# Patient Record
Sex: FEMALE | Race: WHITE | Employment: FULL TIME | URBAN - METROPOLITAN AREA
[De-identification: names, ages, dates, MRNs, and addresses within clinical notes are randomized per-mention and may not be internally consistent; named-entity substitution may affect disease eponyms.]

---

## 2017-01-06 ENCOUNTER — OFFICE VISIT (OUTPATIENT)
Dept: CARDIOLOGY CLINIC | Age: 52
End: 2017-01-06

## 2017-01-06 VITALS
WEIGHT: 270 LBS | SYSTOLIC BLOOD PRESSURE: 130 MMHG | HEIGHT: 66 IN | DIASTOLIC BLOOD PRESSURE: 80 MMHG | OXYGEN SATURATION: 98 % | RESPIRATION RATE: 16 BRPM | BODY MASS INDEX: 43.39 KG/M2

## 2017-01-06 DIAGNOSIS — I44.2 COMPLETE HEART BLOCK (HCC): ICD-10-CM

## 2017-01-06 DIAGNOSIS — I42.9 CARDIOMYOPATHY (HCC): Primary | ICD-10-CM

## 2017-01-06 NOTE — MR AVS SNAPSHOT
Visit Information Date & Time Provider Department Dept. Phone Encounter #  
 1/6/2017  8:20 AM Celine Hsieh MD [facility] [place] [facility] [phone] [id number] Upcoming Health Maintenance Date Due Hepatitis C Screening 1965 FOBT Q 1 YEAR AGE 50-75 7/3/2014 INFLUENZA AGE 9 TO ADULT 8/1/2016 BREAST CANCER SCRN MAMMOGRAM 9/13/2018 PAP AKA CERVICAL CYTOLOGY 9/13/2019 DTaP/Tdap/Td series (2 - Td) 9/13/2026 Allergies as of 1/6/2017  Review Complete On: 1/6/2017 By: Marilu Landry No Known Allergies Current Immunizations  Reviewed on 9/13/2016 Name Date Influenza Vaccine 12/1/2013 Pneumococcal Polysaccharide (PPSV-23) 2/12/2014  3:25 PM  
 Tdap 9/13/2016 Not reviewed this visit You Were Diagnosed With   
  
 Codes Comments Complete heart block (HCC)    -  Primary ICD-10-CM: G62.0 ICD-9-CM: 426.0 Vitals BP Resp Height(growth percentile) Weight(growth percentile) SpO2 BMI  
 130/80 (BP 1 Location: Left arm, BP Patient Position: Sitting) 16 5' 6\" (1.676 m) 270 lb (122.5 kg) 98% 43.58 kg/m2 OB Status Smoking Status Postmenopausal Never Smoker BMI and BSA Data Body Mass Index Body Surface Area 43.58 kg/m 2 2.39 m 2 Preferred Pharmacy Pharmacy Name Phone [id number] [facility] [phone] Your Updated Medication List  
  
   
This list is accurate as of: 1/6/17  9:03 AM.  Always use your most recent med list.  
  
  
  
  
 aspirin 81 mg chewable tablet Take 81 mg by mouth daily. carvedilol 6.25 mg tablet Commonly known as:  COREG  
TAKE 1 TABLET BY MOUTH TWICE A DAY  
  
 ergocalciferol 50,000 unit capsule Commonly known as:  ERGOCALCIFEROL Take 1 Cap by mouth every seven (7) days. furosemide 20 mg tablet Commonly known as:  LASIX Take 1 Tab by mouth daily. lisinopril 2.5 mg tablet Commonly known as:  Thersia Kubas Take 1 Tab by mouth daily. potassium chloride SA 10 mEq capsule Commonly known as:  MICRO-K  
TAKE ONE CAPSULE BY MOUTH EVERY DAY We Performed the Following AMB POC EKG ROUTINE W/ 12 LEADS, INTER & REP [61343 CPT(R)] Patient Instructions Echo in 6 months and see Markell Story a week after Introducing [facility] & HEALTH SERVICES! Dear Jarvis Benson: Thank you for requesting a [employer] account. Our records indicate that you already have an active [employer] Systems account. You can access your account anytime at [email or web address] Fly me to the [email or web address]! Did you know that you can access your hospital and ER discharge instructions at any time in [employer] Systems? You can also review all of your test results from your hospital stay or ER visit. Additional Information If you have questions, please visit the Frequently Asked Questions section of the [employer] Systems website at [email or web address] Fly me to the [email or web address] Remember, [employer] Systems is NOT to be used for urgent needs. For medical emergencies, dial 911. Now available from your iPhone and Android! Please provide this summary of care documentation to your next provider. Your primary care clinician is listed as Sid Cuenca. If you have any questions after today's visit, please call [phone].

## 2017-01-06 NOTE — PROGRESS NOTES
HISTORY OF PRESENT ILLNESS  Elvis Perez is a 45 y.o. female. previously followed by Dr. Mike Crabtree. She has a history of hypertension and previous moderate left ventricular systolic dysfunction by echocardiography. She underwent a left heart cardiac catheterization following an abnormal stress test. This apparently occurred at [facility] in 2006, and revealed no evidence of significant coronary obstructive disease. Her ejection fraction eventually improved. Date of previous stress test was 4/2007. This was a Persantine Myoview stress test. No chest pain was reported. Indeterminate electrocardiographic changes. There were no evidences for gross ischemia. There was fixed defect at the level of the anterior wall for which tissue attenuation could be responsible. Ejection fraction was estimated at 52%. echocardiogram on 3/2007. Normal left ventricular systolic function with an ejection fraction approximately of 50%, mild left ventricular hypertrophy, trivial aortic insufficiency and mild mitral regurgitation. Stress echo on 2010 no ischemia or MI and EF at rest 50%  Echo on 2/14:Systolic function was at the lower limits of normal.  Ejection fraction was estimated to be 45 % in the range of 40 % to 50 %. There was mild diffuse hypokinesis. Left atrium: The atrium was mildly dilated. Tricuspid valve: There was mild regurgitation. PPM for CHB on 2/14 with Dr. Geri Mcpherson, unfortunately leads dislodge next day and patient was taken to [facility] after syncopal episode and second PPM placed  Echo on 11/15:Systolic function was mildly reduced by EF (biplane method  of disks). Ejection fraction was estimated to be 44 %. There was mild  diffuse hypokinesis. There was moderate hypokinesis of the mid-apical  anterior, mid-apical inferior, and apical lateral wall(s). Doppler  parameters were consistent with abnormal left ventricular relaxation  (grade 1 diastolic dysfunction). Left atrium:  The atrium was mildly dilated. ECHO on 6/16:Left ventricle: Size was normal. Systolic function was by EF (biplane  method of disks). Ejection fraction was estimated to be 44 %. There was  moderate hypokinesis of the mid-apical anterior wall(s). Wall thickness  was normal.  COMPARISONS:  There has been no significant change. Comparison was made with the  previous study of 06-Nov-2015. Past Medical History   Diagnosis Date    Essential hypertension     Left ventricular dysfunction      Past Surgical History   Procedure Laterality Date    Pr cardiac surg procedure unlist       cath 2005    Ins ppm/icd led dual only  2/12/2014          Hx orthopaedic  1990     arthroscopic surgery right knee     Social History     Social History    Marital status:      Spouse name: N/A    Number of children: N/A    Years of education: N/A     Occupational History    Not on file. Social History Main Topics    Smoking status: Never Smoker    Smokeless tobacco: Never Used    Alcohol use No    Drug use: Not on file    Sexual activity: Not on file     Other Topics Concern    Not on file     Social History Narrative       HPI  Doing well no complaints at all  Review of Systems   Respiratory: Negative. Cardiovascular: Negative. Physical Exam  Physical Exam   Blood pressure 130/80, resp. rate 16, height 5' 6\" (1.676 m), weight 122.5 kg (270 lb), SpO2 98 %. Constitutional: She is oriented to person, place, and time. She appears well-developed and well-nourished. No distress. HENT: Head: Normocephalic. Eyes: No scleral icterus. Neck: Normal range of motion. Neck supple. No JVD present. No tracheal deviation present. Cardiovascular: Normal rate, regular rhythm, normal heart sounds and intact distal pulses. Exam reveals no gallop and no friction rub. No murmur heard. Pulmonary/Chest: Effort normal and breath sounds normal. No stridor. No respiratory distress, wheezes or  rales.    Abdominal: She exhibits no distension. Musculoskeletal: She exhibits no edema. Neurological: She is alert and oriented to person, place, and time. Coordination normal.   Skin: Skin is warm. No rash noted. Not diaphoretic. No erythema. Psychiatric:  Normal mood and affect. Behavior is normal.   Current Outpatient Prescriptions on File Prior to Visit   Medication Sig Dispense Refill    potassium chloride SA (MICRO-K) 10 mEq capsule TAKE ONE CAPSULE BY MOUTH EVERY DAY 90 Cap 3    carvedilol (COREG) 6.25 mg tablet TAKE 1 TABLET BY MOUTH TWICE A  Tab 3    ergocalciferol (ERGOCALCIFEROL) 50,000 unit capsule Take 1 Cap by mouth every seven (7) days. 12 Cap 1    aspirin 81 mg chewable tablet Take 81 mg by mouth daily.  furosemide (LASIX) 20 mg tablet Take 1 Tab by mouth daily. 90 Tab 3    lisinopril (PRINIVIL, ZESTRIL) 2.5 mg tablet Take 1 Tab by mouth daily. 90 Tab 3     No current facility-administered medications on file prior to visit. ASSESSMENT and PLAN  CMP: mild non ischemic, lv dysfunction , possibly multifactorial. clinically it seems compensated,  NYHA class1  Her ECG showed NSR and V paced. Continue coreg, and lisinopril 2.5 mg daily. no untoward effects thus far and have discussed potential side effects of medications  Continue  lasix 20 mg and potassium 10 meq. Obesity:She has lost 4 lbs!   We have discussed at length, need for exercise and loose weight and different ways to do it  PPM: continue routine surveillance  HTN:controlled  HLD: borderline ldl , for now weight loss and exercise and hold off pharmacological rx  Hba1c: elevated long conversation about pre diabetes and need for carbs reduction and again weight loss and exercise, she will follow with her pcp  See her back in 6 months

## 2017-01-12 DIAGNOSIS — I42.9 CARDIOMYOPATHY (HCC): ICD-10-CM

## 2017-01-12 DIAGNOSIS — I10 ESSENTIAL HYPERTENSION, BENIGN: ICD-10-CM

## 2017-01-12 RX ORDER — FUROSEMIDE 20 MG/1
20 TABLET ORAL DAILY
Qty: 90 TAB | Refills: 3 | Status: SHIPPED | OUTPATIENT
Start: 2017-01-12 | End: 2018-01-16 | Stop reason: SDUPTHER

## 2017-01-12 NOTE — TELEPHONE ENCOUNTER
Patient is out of this medication    Pharmacy verified     90 day supply with refills     Thank you, Leonela Garcia

## 2017-01-12 NOTE — TELEPHONE ENCOUNTER
Patient is out of this medication    Pharmacy verified     90 day supply with refills     Thank you, Sena

## 2017-04-11 DIAGNOSIS — E56.9 VITAMIN DEFICIENCY: ICD-10-CM

## 2017-04-11 RX ORDER — ERGOCALCIFEROL 1.25 MG/1
CAPSULE ORAL
Qty: 12 CAP | Refills: 1 | Status: SHIPPED | OUTPATIENT
Start: 2017-04-11 | End: 2018-02-02

## 2017-07-11 ENCOUNTER — CLINICAL SUPPORT (OUTPATIENT)
Dept: CARDIOLOGY CLINIC | Age: 52
End: 2017-07-11

## 2017-07-11 DIAGNOSIS — R07.9 CHEST PAIN, UNSPECIFIED: ICD-10-CM

## 2017-07-11 DIAGNOSIS — I44.2 COMPLETE HEART BLOCK (HCC): ICD-10-CM

## 2017-07-11 DIAGNOSIS — I10 ESSENTIAL HYPERTENSION, BENIGN: ICD-10-CM

## 2017-07-11 DIAGNOSIS — R42 DIZZINESS: ICD-10-CM

## 2017-07-11 DIAGNOSIS — Z95.0 S/P PLACEMENT OF CARDIAC PACEMAKER: ICD-10-CM

## 2017-07-11 DIAGNOSIS — I42.9 CARDIOMYOPATHY, UNSPECIFIED TYPE (HCC): ICD-10-CM

## 2017-07-11 DIAGNOSIS — R94.31 NONSPECIFIC ABNORMAL ELECTROCARDIOGRAM (ECG) (EKG): Primary | ICD-10-CM

## 2018-02-02 ENCOUNTER — OFFICE VISIT (OUTPATIENT)
Dept: CARDIOLOGY CLINIC | Age: 53
End: 2018-02-02

## 2018-02-02 VITALS
WEIGHT: 264 LBS | HEIGHT: 66 IN | BODY MASS INDEX: 42.43 KG/M2 | DIASTOLIC BLOOD PRESSURE: 88 MMHG | SYSTOLIC BLOOD PRESSURE: 138 MMHG | OXYGEN SATURATION: 96 % | HEART RATE: 71 BPM | RESPIRATION RATE: 12 BRPM

## 2018-02-02 DIAGNOSIS — I10 ESSENTIAL HYPERTENSION, BENIGN: ICD-10-CM

## 2018-02-02 DIAGNOSIS — I42.9 CARDIOMYOPATHY, UNSPECIFIED TYPE (HCC): Primary | ICD-10-CM

## 2018-02-02 PROBLEM — E66.01 OBESITY, MORBID (HCC): Status: ACTIVE | Noted: 2018-02-02

## 2018-02-02 NOTE — MR AVS SNAPSHOT
[address] [facility] [address] [facility] 
[phone] Patient: Israel Lawler MRN: [id number] MCZ:[id number] Visit Information Date & Time Provider Department Dept. Phone Encounter #  
 2/2/2018  9:20 AM Mauro Tunrer MD [facility] [phone] [id number] Follow-up Instructions Return in about 6 months (around 8/2/2018). Follow-up and Disposition History Upcoming Health Maintenance Date Due Hepatitis C Screening 1965 FOBT Q 1 YEAR AGE 50-75 7/3/2014 Influenza Age 5 to Adult 8/1/2017 BREAST CANCER SCRN MAMMOGRAM 9/13/2018 PAP AKA CERVICAL CYTOLOGY 9/13/2019 DTaP/Tdap/Td series (2 - Td) 9/13/2026 Allergies as of 2/2/2018  Review Complete On: 2/2/2018 By: Mauro Turner MD  
 No Known Allergies Current Immunizations  Reviewed on 9/13/2016 Name Date Influenza Vaccine 12/1/2013 Pneumococcal Polysaccharide (PPSV-23) 2/12/2014  3:25 PM  
 Tdap 9/13/2016 Not reviewed this visit You Were Diagnosed With   
  
 Codes Comments [facility]    -  Primary ICD-10-CM: I42.9 ICD-9-CM: 425.4 Essential hypertension, benign     ICD-10-CM: I10 
ICD-9-CM: 401.1 Vitals BP Pulse Resp Height(growth percentile) Weight(growth percentile) SpO2  
 138/88 (BP 1 Location: Right arm, BP Patient Position: Sitting) 71 12 5' 6\" (1.676 m) 264 lb (119.7 kg) 96% BMI OB Status Smoking Status 42.61 kg/m2 Postmenopausal Never Smoker BMI and BSA Data Body Mass Index Body Surface Area  
 42.61 kg/m 2 2.36 m 2 Preferred Pharmacy Pharmacy Name Phone [id number] [facility] [phone] Your Updated Medication List  
  
   
This list is accurate as of: 2/2/18 10:41 AM.  Always use your most recent med list.  
  
  
  
  
 aspirin 81 mg chewable tablet Take 81 mg by mouth daily. carvedilol 6.25 mg tablet Commonly known as:  COREG  
TAKE 1 TABLET BY MOUTH TWICE A DAY  
  
 furosemide 20 mg tablet Commonly known as:  LASIX TAKE 1 TAB BY MOUTH DAILY. lisinopril 2.5 mg tablet Commonly known as:  Alcario Deepak Take 1 Tab by mouth daily. potassium chloride SA 10 mEq capsule Commonly known as:  MICRO-K  
TAKE ONE CAPSULE BY MOUTH EVERY DAY  
  
 VITAMIN B-6 PO Take  by mouth daily. We Performed the Following AMB POC EKG ROUTINE W/ 12 LEADS, INTER & REP [92861 CPT(R)] Follow-up Instructions Return in about 6 months (around 8/2/2018). Patient Instructions Echo in 6 months and see Gómez Benoit a week after Introducing [facility] & HEALTH SERVICES! Dear Anastasiya Reynolds: Thank you for requesting a [employer] account. Our records indicate that you already have an active [employer] account. You can access your account anytime at [email or web address]. [email or web address] Did you know that you can access your hospital and ER discharge instructions at any time in [employer]? You can also review all of your test results from your hospital stay or ER visit. Additional Information If you have questions, please visit the Frequently Asked Questions section of the [employer] website at [email or web address] Remember, [employer] is NOT to be used for urgent needs. For medical emergencies, dial 911. Now available from your iPhone and Android! Please provide this summary of care documentation to your next provider. Your primary care clinician is listed as Shona Strickland. If you have any questions after today's visit, please call [phone].

## 2018-02-02 NOTE — PROGRESS NOTES
HISTORY OF PRESENT ILLNESS  Sudarshan Oconnor is a 47 y.o. female. previously followed by Dr. Columba De La Cruz. She has a history of hypertension and previous moderate left ventricular systolic dysfunction by echocardiography. She underwent a left heart cardiac catheterization following an abnormal stress test. This apparently occurred at [facility] in 2006, and revealed no evidence of significant coronary obstructive disease. Her ejection fraction eventually improved. Date of previous stress test was 4/2007. This was a Persantine Myoview stress test. No chest pain was reported. Indeterminate electrocardiographic changes. There were no evidences for gross ischemia. There was fixed defect at the level of the anterior wall for which tissue attenuation could be responsible. Ejection fraction was estimated at 52%. echocardiogram on 3/2007. Normal left ventricular systolic function with an ejection fraction approximately of 50%, mild left ventricular hypertrophy, trivial aortic insufficiency and mild mitral regurgitation. Stress echo on 2010 no ischemia or MI and EF at rest 50%  Echo on 2/14:Systolic function was at the lower limits of normal.  Ejection fraction was estimated to be 45 % in the range of 40 % to 50 %. There was mild diffuse hypokinesis. Left atrium: The atrium was mildly dilated. Tricuspid valve: There was mild regurgitation. PPM for CHB on 2/14 with Dr. Maryam Liu, unfortunately leads dislodge next day and patient was taken to [facility] after syncopal episode and second PPM placed  Echo on 11/15:Systolic function was mildly reduced by EF (biplane method  of disks). Ejection fraction was estimated to be 44 %. There was mild  diffuse hypokinesis. There was moderate hypokinesis of the mid-apical  anterior, mid-apical inferior, and apical lateral wall(s). Doppler  parameters were consistent with abnormal left ventricular relaxation  (grade 1 diastolic dysfunction). Left atrium:  The atrium was mildly dilated. ECHO on 6/16:Left ventricle: Size was normal. Systolic function was by EF (biplane  method of disks). Ejection fraction was estimated to be 44 %. There was  moderate hypokinesis of the mid-apical anterior wall(s). Wall thickness  was normal.  COMPARISONS:  There has been no significant change. Comparison was made with the  previous study of 06-Nov-2015. ECHO on 7/17:Systolic function was at the lower limits of normal.  Ejection fraction was estimated in the range of 50 % to 55 %. There were  no regional wall motion abnormalities. COMPARISONS:  Comparison was made with the previous study of 07-Jun-2016. LV overall  function has increased from 44 % to 53 %. Past Medical History:   Diagnosis Date    Essential hypertension     Left ventricular dysfunction      Past Surgical History:   Procedure Laterality Date    CARDIAC SURG PROCEDURE UNLIST      cath 2005     ORTHOPAEDIC  1990    arthroscopic surgery right knee    INS PPM/ICD LED DUAL ONLY  2/12/2014            HPI  Doing well no cp or palpitations or syncope no worsening sob mostly stressed  Some snoring reported  Review of Systems   Respiratory: Negative. Cardiovascular: Negative. Physical Exam  Physical Exam   Blood pressure 138/88, pulse 71, resp. rate 12, height 5' 6\" (1.676 m), weight 119.7 kg (264 lb), SpO2 96 %. Constitutional: She is oriented to person, place, and time. She appears well-developed and well-nourished. No distress. HENT: Head: Normocephalic. Eyes: No scleral icterus. Neck: Normal range of motion. Neck supple. No JVD present. No tracheal deviation present. Cardiovascular: Normal rate, regular rhythm, normal heart sounds and intact distal pulses. Exam reveals no gallop and no friction rub. No murmur heard. Pulmonary/Chest: Effort normal and breath sounds normal. No stridor. No respiratory distress, wheezes or  rales. Abdominal: She exhibits no distension.    Musculoskeletal: She exhibits no edema. Neurological: She is alert and oriented to person, place, and time. Coordination normal.   Skin: Skin is warm. No rash noted. Not diaphoretic. No erythema. Psychiatric:  Normal mood and affect. Behavior is normal.   Current Outpatient Prescriptions on File Prior to Visit   Medication Sig Dispense Refill    carvedilol (COREG) 6.25 mg tablet TAKE 1 TABLET BY MOUTH TWICE A DAY 60 Tab 3    potassium chloride SA (MICRO-K) 10 mEq capsule TAKE ONE CAPSULE BY MOUTH EVERY DAY 30 Cap 0    furosemide (LASIX) 20 mg tablet TAKE 1 TAB BY MOUTH DAILY. 30 Tab 3    aspirin 81 mg chewable tablet Take 81 mg by mouth daily.  lisinopril (PRINIVIL, ZESTRIL) 2.5 mg tablet Take 1 Tab by mouth daily. 90 Tab 3     No current facility-administered medications on file prior to visit. ASSESSMENT and PLAN  CMP: mild , non ischemic, lv dysfunction , possibly multifactorial. clinically it seems compensated,  NYHA class 1  Her ECG showed NSR and V paced. Continue coreg, and lisinopril 2.5 mg daily. no untoward effects thus far and have discussed potential side effects of medications  Continue  lasix 20 mg and potassium 10 meq. We have discussed entresto but this is on the back burner for now  Obesity:She has lost 6 more lbs!   We have discussed at length, need for exercise and loose weight and different ways to do it  PPM: continue routine surveillance with Dr. Laura Fowler  HTN: relatively controlled  Low salt diet  HLD: closely followed by her pcp  Fatigue: likely multifactorial, coreg playing a role discussed potential for toprol qhs but she wants to wait also consder sleep study for kirsten , she will discuss with her pcp    See her back in 6 months with echo

## 2018-02-02 NOTE — PROGRESS NOTES
Chief Complaint   Patient presents with    Hypertension     yearly follow up. Denies chest pain. Occasional swelling in left leg.  Cardiomyopathy    Shortness of Breath     continues    Dizziness     states evaluated with inner ear problems. Medication profile updated. Verbal order per Dr. Rikki Treadwell.

## 2018-08-02 ENCOUNTER — CLINICAL SUPPORT (OUTPATIENT)
Dept: CARDIOLOGY CLINIC | Age: 53
End: 2018-08-02

## 2018-08-02 DIAGNOSIS — I44.2 COMPLETE HEART BLOCK (HCC): ICD-10-CM

## 2018-08-02 DIAGNOSIS — R94.31 NONSPECIFIC ABNORMAL ELECTROCARDIOGRAM (ECG) (EKG): ICD-10-CM

## 2018-08-02 DIAGNOSIS — I10 ESSENTIAL HYPERTENSION, BENIGN: ICD-10-CM

## 2018-08-02 DIAGNOSIS — E66.01 OBESITY, MORBID (HCC): ICD-10-CM

## 2018-08-02 DIAGNOSIS — I42.9 CARDIOMYOPATHY, UNSPECIFIED TYPE (HCC): ICD-10-CM

## 2018-08-02 DIAGNOSIS — R42 DIZZINESS: ICD-10-CM

## 2018-08-02 DIAGNOSIS — Z95.0 S/P PLACEMENT OF CARDIAC PACEMAKER: ICD-10-CM

## 2018-09-21 ENCOUNTER — OFFICE VISIT (OUTPATIENT)
Dept: CARDIOLOGY CLINIC | Age: 53
End: 2018-09-21

## 2018-09-21 VITALS
WEIGHT: 281 LBS | RESPIRATION RATE: 16 BRPM | DIASTOLIC BLOOD PRESSURE: 86 MMHG | SYSTOLIC BLOOD PRESSURE: 130 MMHG | HEIGHT: 66 IN | HEART RATE: 66 BPM | BODY MASS INDEX: 45.16 KG/M2 | OXYGEN SATURATION: 98 %

## 2018-09-21 DIAGNOSIS — I44.2 COMPLETE HEART BLOCK (HCC): ICD-10-CM

## 2018-09-21 DIAGNOSIS — R93.1 ABNORMAL ECHOCARDIOGRAM: ICD-10-CM

## 2018-09-21 DIAGNOSIS — R94.31 NONSPECIFIC ABNORMAL ELECTROCARDIOGRAM (ECG) (EKG): Primary | ICD-10-CM

## 2018-09-21 NOTE — MR AVS SNAPSHOT
[address] [facility] [address] [place], [place] [address] 
[phone] Patient: Latosha Gomez MRN: [id number] [facility]:8/0/7779 Visit Information Date & Time Provider Department Dept. Phone Encounter #  
 9/21/2018  8:40 AM Dana Garcia MD [facility] [phone] [id number] Follow-up Instructions Return in about 2 weeks (around 10/5/2018). Follow-up and Disposition History Upcoming Health Maintenance Date Due Hepatitis C Screening 1965 FOBT Q 1 YEAR AGE 50-75 7/3/2014 Influenza Age 5 to Adult 8/1/2018 BREAST CANCER SCRN MAMMOGRAM 9/13/2018 PAP AKA CERVICAL CYTOLOGY 9/13/2019 DTaP/Tdap/Td series (2 - Td) 9/13/2026 Allergies as of 9/21/2018  Review Complete On: 9/21/2018 By: Dana Garcia MD  
 No Known Allergies Current Immunizations  Reviewed on 9/13/2016 Name Date Influenza Vaccine 12/1/2013 Pneumococcal Polysaccharide (PPSV-23) 2/12/2014  3:25 PM  
 Tdap 9/13/2016 Not reviewed this visit You Were Diagnosed With   
  
 Codes Comments Complete heart block (HCC)    -  Primary ICD-10-CM: T90.6 ICD-9-CM: 426.0 Vitals BP Pulse Resp Height(growth percentile) Weight(growth percentile) SpO2  
 130/86 (BP 1 Location: Left arm, BP Patient Position: Sitting) 66 16 5' 6\" (1.676 m) 281 lb (127.5 kg) 98% BMI OB Status Smoking Status 45.35 kg/m2 Postmenopausal Never Smoker Vitals History BMI and BSA Data Body Mass Index Body Surface Area  
 45.35 kg/m 2 2.44 m 2 Preferred Pharmacy Pharmacy Name Phone [phone] [facility] [phone] Your Updated Medication List  
  
   
This list is accurate as of 9/21/18  9:42 AM.  Always use your most recent med list.  
  
  
  
  
 aspirin 81 mg chewable tablet Take 81 mg by mouth daily. carvedilol 6.25 mg tablet Commonly known as:  COREG  
TAKE 1 TABLET BY MOUTH TWICE A DAY  
  
 furosemide 20 mg tablet Commonly known as:  LASIX TAKE 1 TAB BY MOUTH DAILY. potassium chloride SA 10 mEq capsule Commonly known as:  MICRO-K  
TAKE ONE CAPSULE BY MOUTH EVERY DAY  
  
 VITAMIN B-6 PO Take  by mouth daily. We Performed the Following AMB POC EKG ROUTINE W/ 12 LEADS, INTER & REP [22554 CPT(R)] Follow-up Instructions Return in about 2 weeks (around 10/5/2018). Patient Instructions Stop Lisinopril Lexiscan stress test in 2 weeks and see  a week after Patient Instructions History Introducing [facility] & HEALTH SERVICES! Dear Jluis Bacon: Thank you for requesting a GoTunes account. Our records indicate that you already have an active GoTunes account. You can access your account anytime at [email or web address]. [email or web address]/[email or web address] Mobile Did you know that you can access your hospital and ER discharge instructions at any time in GoTunes? You can also review all of your test results from your hospital stay or ER visit. Additional Information If you have questions, please visit the Frequently Asked Questions section of the GoTunes website at [email or web address] Remember, GoTunes is NOT to be used for urgent needs. For medical emergencies, dial 911. Now available from your iPhone and Android! Please provide this summary of care documentation to your next provider. Your primary care clinician is listed as Shona Strickland. If you have any questions after today's visit, please call [phone].

## 2018-09-21 NOTE — PROGRESS NOTES
HISTORY OF PRESENT ILLNESS  Maxine Shaffer is a 46 y.o. female. previously followed by Dr. Katrina Smith. She has a history of hypertension and previous moderate left ventricular systolic dysfunction by echocardiography. She underwent a left heart cardiac catheterization following an abnormal stress test. This apparently occurred at [address] [place] [address] in 2006, and revealed no evidence of significant coronary obstructive disease. Her ejection fraction eventually improved. Date of previous stress test was 4/2007. This was a Persantine Myoview stress test. No chest pain was reported. Indeterminate electrocardiographic changes. There were no evidences for gross ischemia. There was fixed defect at the level of the anterior wall for which tissue attenuation could be responsible. Ejection fraction was estimated at 52%.   echocardiogram on 3/2007. Normal left ventricular systolic function with an ejection fraction approximately of 50%, mild left ventricular hypertrophy, trivial aortic insufficiency and mild mitral regurgitation. Stress echo on 2010 no ischemia or MI and EF at rest 50%  Echo on 2/14:Systolic function was at the lower limits of normal.  Ejection fraction was estimated to be 45 % in the range of 40 % to 50 %. There was mild diffuse hypokinesis. Left atrium: The atrium was mildly dilated. Tricuspid valve: There was mild regurgitation. PPM for CHB on 2/14 with Dr. Connor Pichardo, unfortunately leads dislodge next day and patient was taken to [facility] after syncopal episode and second PPM placed  Echo on 11/15:Systolic function was mildly reduced by EF (biplane method  of disks). Ejection fraction was estimated to be 44 %. There was mild  diffuse hypokinesis. There was moderate hypokinesis of the mid-apical  anterior, mid-apical inferior, and apical lateral wall(s). Doppler  parameters were consistent with abnormal left ventricular relaxation  (grade 1 diastolic dysfunction). Left atrium:  The atrium was mildly dilated. ECHO on 6/16:Left ventricle: Size was normal. Systolic function was by EF (biplane  method of disks). Ejection fraction was estimated to be 44 %. There was  moderate hypokinesis of the mid-apical anterior wall(s). Wall thickness  was normal.  COMPARISONS:  There has been no significant change. Comparison was made with the  previous study of 06-Nov-2015. ECHO on 7/17:Systolic function was at the lower limits of normal.  Ejection fraction was estimated in the range of 50 % to 55 %. There were  no regional wall motion abnormalities. COMPARISONS:  Comparison was made with the previous study of 07-Jun-2016. LV overall  function has increased from 44 % to 53 %. Echo on 8/18:Systolic function was at the lower limits of normal.  Ejection fraction was estimated to be 50 %. There was hypokinesis of the  mid-apical inferior, apical septal, and apical lateral wall(s). Wall  thickness was mildly increased. Aortic valve: The valve was trileaflet. Leaflets exhibited sclerosis. Comparisons: LV overall function has not changed. RWMA now noted       Past Medical History:   Diagnosis Date    Essential hypertension      Left ventricular dysfunction              Past Surgical History:   Procedure Laterality Date    CARDIAC SURG PROCEDURE UNLIST         cath 2005    HX ORTHOPAEDIC   1990     arthroscopic surgery right knee    INS PPM/ICD LED DUAL ONLY   2/12/2014            Family History   Problem Relation Age of Onset    Stroke Mother      Both her father and brother had mi at age 48  HPI  No clear cp or sob reported no palpitations  Mostly nasal congestion and tinnitus  Review of Systems   HENT: Positive for congestion and tinnitus. Respiratory: Positive for cough. Negative for hemoptysis, sputum production, shortness of breath and wheezing. Cardiovascular: Negative.       Visit Vitals    /86 (BP 1 Location: Left arm, BP Patient Position: Sitting)    Pulse 66    Resp 16    Ht 5' 6\" (1.676 m)    Wt 127.5 kg (281 lb)    SpO2 98%    BMI 45.35 kg/m2       Physical Exam   Neck: No JVD present. Carotid bruit is not present. Cardiovascular: Normal rate and regular rhythm. Exam reveals distant heart sounds. Pulmonary/Chest: Effort normal and breath sounds normal.   Abdominal: Soft. Musculoskeletal: She exhibits no edema. Psychiatric: She has a normal mood and affect. Current Outpatient Prescriptions on File Prior to Visit   Medication Sig Dispense Refill    carvedilol (COREG) 6.25 mg tablet TAKE 1 TABLET BY MOUTH TWICE A DAY 60 Tab 3    potassium chloride SA (MICRO-K) 10 mEq capsule TAKE ONE CAPSULE BY MOUTH EVERY DAY 30 Cap 6    PYRIDOXINE HCL, VITAMIN B6, (VITAMIN B-6 PO) Take  by mouth daily.  furosemide (LASIX) 20 mg tablet TAKE 1 TAB BY MOUTH DAILY. 30 Tab 3    aspirin 81 mg chewable tablet Take 81 mg by mouth daily.  lisinopril (PRINIVIL, ZESTRIL) 2.5 mg tablet Take 1 Tab by mouth daily. 90 Tab 3     No current facility-administered medications on file prior to visit. ASSESSMENT and PLAN  CMP: mild , non ischemic, mild lv dysfunction , possibly multifactorial. clinically it seems compensated,  NYHA class 1  Her ECG showed NSR and V paced. Discussed rwma on new echo. No clear symptoms but strong fh for CAD  Discussed options  Proceed with nuclear stress test  Continue coreg,   Continue  lasix 20 mg and potassium 10 meq. Dry cough and congestion reported for 2 years, seeing ENT but just in case I will stop temporarily lisinopril for 2 weeks and she will let me know if any improvement in congestion and/or elevation in BP  We will need echo surveillance     Obesity:We have discussed at length, need for exercise and loose weight and different ways to do it    PPM: continue routine surveillance with Dr. Suri Salazar need to determine pacing percentage.  Discussed cmp associated to PPM and at times need for crt    HTN: relatively controlled  Low salt diet    HLD: closely followed by her pcp     See her back after stress test

## 2018-10-11 ENCOUNTER — CLINICAL SUPPORT (OUTPATIENT)
Dept: CARDIOLOGY CLINIC | Age: 53
End: 2018-10-11

## 2018-10-11 DIAGNOSIS — R94.31 NONSPECIFIC ABNORMAL ELECTROCARDIOGRAM (ECG) (EKG): ICD-10-CM

## 2018-10-11 DIAGNOSIS — R94.31 ABNORMAL EKG: ICD-10-CM

## 2018-10-11 DIAGNOSIS — I10 ESSENTIAL HYPERTENSION, BENIGN: ICD-10-CM

## 2018-10-11 DIAGNOSIS — E78.5 HYPERLIPIDEMIA, UNSPECIFIED HYPERLIPIDEMIA TYPE: ICD-10-CM

## 2018-10-11 DIAGNOSIS — R93.1 ABNORMAL ECHOCARDIOGRAM: ICD-10-CM

## 2018-10-11 NOTE — PROGRESS NOTES
See scanned document. Ordering Doctor:Dr. Donavan Barone  Reading Doctor:Dr. Donavan Barone    Patient tolerated procedure well.

## 2018-10-12 ENCOUNTER — CLINICAL SUPPORT (OUTPATIENT)
Dept: CARDIOLOGY CLINIC | Age: 53
End: 2018-10-12

## 2018-10-12 DIAGNOSIS — I10 ESSENTIAL HYPERTENSION, BENIGN: ICD-10-CM

## 2018-10-12 DIAGNOSIS — I44.2 COMPLETE HEART BLOCK (HCC): Primary | ICD-10-CM

## 2018-10-12 DIAGNOSIS — Z95.0 S/P PLACEMENT OF CARDIAC PACEMAKER: ICD-10-CM

## 2018-10-12 DIAGNOSIS — R07.9 CHEST PAIN, UNSPECIFIED TYPE: ICD-10-CM

## 2018-10-12 DIAGNOSIS — I42.9 CARDIOMYOPATHY, UNSPECIFIED TYPE (HCC): ICD-10-CM

## 2018-10-12 DIAGNOSIS — R94.31 NONSPECIFIC ABNORMAL ELECTROCARDIOGRAM (ECG) (EKG): ICD-10-CM

## 2018-10-12 NOTE — PROGRESS NOTES
See scanned document. Ordering Doctor:Dr. Alecia Mcdermott  Reading Doctor:Dr. Alecia Mcdermott    Patient tolerated procedure well.

## 2018-10-19 ENCOUNTER — OFFICE VISIT (OUTPATIENT)
Dept: CARDIOLOGY CLINIC | Age: 53
End: 2018-10-19

## 2018-10-19 VITALS
OXYGEN SATURATION: 97 % | RESPIRATION RATE: 16 BRPM | HEIGHT: 66 IN | SYSTOLIC BLOOD PRESSURE: 130 MMHG | HEART RATE: 80 BPM | DIASTOLIC BLOOD PRESSURE: 80 MMHG | WEIGHT: 283 LBS | BODY MASS INDEX: 45.48 KG/M2

## 2018-10-19 DIAGNOSIS — E66.01 OBESITY, MORBID (HCC): ICD-10-CM

## 2018-10-19 DIAGNOSIS — R07.9 CHEST PAIN, UNSPECIFIED TYPE: ICD-10-CM

## 2018-10-19 DIAGNOSIS — E78.5 HYPERLIPIDEMIA, UNSPECIFIED HYPERLIPIDEMIA TYPE: ICD-10-CM

## 2018-10-19 DIAGNOSIS — Z95.0 S/P PLACEMENT OF CARDIAC PACEMAKER: ICD-10-CM

## 2018-10-19 DIAGNOSIS — I42.0 DILATED CARDIOMYOPATHY (HCC): Primary | ICD-10-CM

## 2018-10-19 DIAGNOSIS — R93.1 ABNORMAL ECHOCARDIOGRAM: ICD-10-CM

## 2018-10-19 DIAGNOSIS — R94.31 NONSPECIFIC ABNORMAL ELECTROCARDIOGRAM (ECG) (EKG): ICD-10-CM

## 2018-10-19 NOTE — PROGRESS NOTES
HISTORY OF PRESENT ILLNESS  Sierra Vera is a 46 y.o. female. previously followed by Dr. Nolvia Escalante. She has a history of hypertension and previous moderate left ventricular systolic dysfunction by echocardiography. She underwent a left heart cardiac catheterization following an abnormal stress test. This apparently occurred at [facility] in 2006, and revealed no evidence of significant coronary obstructive disease. Her ejection fraction eventually improved. Date of previous stress test was 4/2007. This was a Persantine Myoview stress test. No chest pain was reported. Indeterminate electrocardiographic changes. There were no evidences for gross ischemia. There was fixed defect at the level of the anterior wall for which tissue attenuation could be responsible. Ejection fraction was estimated at 52%.   echocardiogram on 3/2007. Normal left ventricular systolic function with an ejection fraction approximately of 50%, mild left ventricular hypertrophy, trivial aortic insufficiency and mild mitral regurgitation. Stress echo on 2010 no ischemia or MI and EF at rest 50%  Echo on 2/14:Systolic function was at the lower limits of normal.  Ejection fraction was estimated to be 45 % in the range of 40 % to 50 %. There was mild diffuse hypokinesis. Left atrium: The atrium was mildly dilated. Tricuspid valve: There was mild regurgitation. PPM for CHB on 2/14 with Dr. Sierra Cole, unfortunately leads dislodge next day and patient was taken to [facility] after syncopal episode and second PPM placed  Echo on 11/15:Systolic function was mildly reduced by EF (biplane method  of disks). Ejection fraction was estimated to be 44 %. There was mild  diffuse hypokinesis. There was moderate hypokinesis of the mid-apical  anterior, mid-apical inferior, and apical lateral wall(s). Doppler  parameters were consistent with abnormal left ventricular relaxation  (grade 1 diastolic dysfunction). Left atrium:  The atrium was mildly dilated. ECHO on 6/16:Left ventricle: Size was normal. Systolic function was by EF (biplane  method of disks). Ejection fraction was estimated to be 44 %. There was  moderate hypokinesis of the mid-apical anterior wall(s). Wall thickness  was normal.  COMPARISONS:  There has been no significant change. Comparison was made with the  previous study of 06-Nov-2015. ECHO on 7/17:Systolic function was at the lower limits of normal.  Ejection fraction was estimated in the range of 50 % to 55 %. There were  no regional wall motion abnormalities. COMPARISONS:  Comparison was made with the previous study of 07-Jun-2016. LV overall  function has increased from 44 % to 53 %. Echo on 8/18:Systolic function was at the lower limits of normal.  Ejection fraction was estimated to be 50 %. There was hypokinesis of the  mid-apical inferior, apical septal, and apical lateral wall(s). Wall  thickness was mildly increased. Aortic valve: The valve was trileaflet. Leaflets exhibited sclerosis. Comparisons: LV overall function has not changed. RWMA now noted  Nuclear stress test on 10/18:Myocardial perfusion imaging is abnormal: there is a small area of infarction in the distal inferior wall. No gross ischemia noted. Also attenuation/pacemaker artifact likely present at the level of anterior wall  Overall left ventricular systolic function was normal. EF 54%          Past Medical History:   Diagnosis Date    Essential hypertension      Left ventricular dysfunction                  Past Surgical History:   Procedure Laterality Date    CARDIAC SURG PROCEDURE UNLIST         cath 2005    HX ORTHOPAEDIC   1990     arthroscopic surgery right knee    INS PPM/ICD LED DUAL ONLY   2/12/2014                   Family History   Problem Relation Age of Onset    Stroke Mother         HPI  For the last 6 weeks she has noticed recurring cp spreading to all of her chest occurring with and without activities.  She feels that something is wrong  Sob also reported  Review of Systems   Respiratory: Positive for shortness of breath. Cardiovascular: Positive for chest pain. Physical Exam  Physical Exam   Blood pressure 130/80, pulse 80, resp. rate 16, height 5' 6\" (1.676 m), weight 128.4 kg (283 lb), SpO2 97 %. Constitutional: She is oriented to person, place, and time. She appears well-developed and well-nourished. No distress. HENT: Head: Normocephalic. Eyes: No scleral icterus. Neck: Normal range of motion. Neck supple. No JVD present. No tracheal deviation present. Cardiovascular: Normal rate, regular rhythm, normal heart sounds and intact distal pulses. Exam reveals no gallop and no friction rub. No murmur heard. Pulmonary/Chest: Effort normal and breath sounds normal. No stridor. No respiratory distress, wheezes or  rales. Abdominal: She exhibits no distension. Musculoskeletal: She exhibits no edema. Neurological: She is alert and oriented to person, place, and time. Coordination normal.   Skin: Skin is warm. No rash noted. Not diaphoretic. No erythema. Psychiatric:  Normal mood and affect. Behavior is normal.   Current Outpatient Medications on File Prior to Visit   Medication Sig Dispense Refill    furosemide (LASIX) 20 mg tablet TAKE 1 TABLET BY MOUTH EVERY DAY 90 Tab 2    carvedilol (COREG) 6.25 mg tablet TAKE 1 TABLET BY MOUTH TWICE A DAY 60 Tab 3    potassium chloride SA (MICRO-K) 10 mEq capsule TAKE ONE CAPSULE BY MOUTH EVERY DAY 30 Cap 6    PYRIDOXINE HCL, VITAMIN B6, (VITAMIN B-6 PO) Take  by mouth daily.  furosemide (LASIX) 20 mg tablet TAKE 1 TAB BY MOUTH DAILY. 30 Tab 3    aspirin 81 mg chewable tablet Take 81 mg by mouth daily. No current facility-administered medications on file prior to visit. ASSESSMENT and PLAN  CMP: mild , non ischemic, mild lv dysfunction , possibly multifactorial. clinically it seems compensated,  NYHA class 1  Her ECG showed NSR and V paced. Discussed rwma on new echo. strong fh for CAD (brother with SCD at 46 and father with mi at 48)  Discussed abnormal stress test and again potential for false positive stress test as it happened in 2006  Given presence of cp and risk factors including strong fh for early cad and scd     I feel that we need to consider cardiac catheterization    The procedure was discussed at length with the patient, including risks/benefits, potential findings and treatment options. Risks including but not limited to CVA, DEATH,MI,ARF, CRF requiring chronic dialysis,emergencgy CABG or pacemaker, bleeding need for transfusions, vascular injuries requiring intervention. Alternative options were offered. .She agrees to proceed. Continue coreg, continue  lasix 20 mg and potassium 10 meq.      echo surveillance      Obesity:We have discussed at length, need for exercise and loose weight and different ways to do it     PPM: continue routine surveillance with Dr. Chevy Gonzalez need to determine pacing percentage.  Discussed cmp associated to PPM and at times potential  need for crt     HTN: relatively controlled  Low salt diet     HLD: closely followed by her pcp     See her back  after procedure

## 2018-10-19 NOTE — MR AVS SNAPSHOT
[address] [place] [address] 
[phone] Patient: Ghislaine Kraft MRN: [id number] YWP:[birth year] Visit Information Date & Time Provider Department Dept. Phone Encounter #  
 10/19/2018  3:00 PM Kang Bey MD [facility] Cristiana Seaman [phone] [id number] Your Appointments 10/11/2018  8:30 AM  
NUCLEAR MEDICINE with NUCLEARSTANISLAW [facility] (DAVIDE SCHEDULING) Appt Note: lexiscan wt 281 ht 5'6 dx abnormal ekg/echo per DR Slime [address]  
[address] [address]  
  
    
 10/12/2018  8:30 AM  
NUCLEAR MEDICINE with NUCLEARMelly [facility] (DAVIDE SCHEDULING) Appt Note: lexiscan wt 281 ht 5'6 dx abnormal ekg/echo per DR Slime [address]  
[phone]  
  
    
 10/19/2018  3:00 PM  
ESTABLISHED PATIENT with Kang Bey MD  
[facility] ([facility]IDE SCHEDULING) Appt Note: 1 week f/u  
 [address]  [address] [place] [address]  
[address] [facility] [phone] Upcoming Health Maintenance Date Due Hepatitis C Screening 1965 Shingrix Vaccine Age 50> (1 of 2) 7/3/2014 FOBT Q 1 YEAR AGE 50-75 7/3/2014 Influenza Age 5 to Adult 8/1/2018 BREAST CANCER SCRN MAMMOGRAM 9/13/2018 PAP AKA CERVICAL CYTOLOGY 9/13/2019 DTaP/Tdap/Td series (2 - Td) 9/13/2026 Allergies as of 10/19/2018  Review Complete On: 9/21/2018 By: Kang Bey MD  
 No Known Allergies Current Immunizations  Reviewed on 9/13/2016 Name Date Influenza Vaccine 12/1/2013 Pneumococcal Polysaccharide (PPSV-23) 2/12/2014  3:25 PM  
 Tdap 9/13/2016 Not reviewed this visit Vitals OB Status Smoking Status Postmenopausal Never Smoker Your Updated Medication List  
  
   
This list is accurate as of 10/9/18  8:04 AM.  Always use your most recent med list.  
  
  
  
  
 aspirin 81 mg chewable tablet Take 81 mg by mouth daily. carvedilol 6.25 mg tablet Commonly known as:  COREG  
TAKE 1 TABLET BY MOUTH TWICE A DAY  
  
 furosemide 20 mg tablet Commonly known as:  LASIX TAKE 1 TAB BY MOUTH DAILY. potassium chloride SA 10 mEq capsule Commonly known as:  MICRO-K  
TAKE ONE CAPSULE BY MOUTH EVERY DAY  
  
 VITAMIN B-6 PO Take  by mouth daily. Introducing [facility]! Dear Duard Rubinstein: Thank you for requesting a NightOwl account. Our records indicate that you already have an active NightOwl account. You can access your account anytime at [email or web address] Did you know that you can access your hospital and ER discharge instructions at any time in NightOwl? You can also review all of your test results from your hospital stay or ER visit. Additional Information If you have questions, please visit the Frequently Asked Questions section of the NightOwl website at [email or web address] Remember, NightOwl is NOT to be used for urgent needs. For medical emergencies, dial 911. Now available from your iPhone and Android! Please provide this summary of care documentation to your next provider. Your primary care clinician is listed as Shona Strickland. If you have any questions after today's visit, please call [phone].

## 2018-10-19 NOTE — PATIENT INSTRUCTIONS
Your cardiac catheterization procedure has been scheduled for 10/30/18 at 1:15pm, at [facility].    Please report to Admitting Department by 11:15am, or 2 hours prior to your scheduled procedure. Please bring a list of your current medications and medication bottles, if able, to the hospital on this day. You will be unable to drive after your procedure so please make sure to bring someone with you to your procedure. You will need to have nothing to eat or drink after midnight, the night prior to your procedure. You may have small sips of water, if needed, to take with your medication. You will need labs drawn prior to your procedure. Please go to [facility] to have this done. You will need to have a chest xray performed prior to procedure. Please go to an imaging center to have this done. You should hold your Lasix the day of your procedure.

## 2018-10-22 ENCOUNTER — TELEPHONE (OUTPATIENT)
Dept: CARDIOLOGY CLINIC | Age: 53
End: 2018-10-22

## 2018-10-22 DIAGNOSIS — R07.9 CHEST PAIN, UNSPECIFIED TYPE: Primary | ICD-10-CM

## 2018-10-22 RX ORDER — DIPHENHYDRAMINE HYDROCHLORIDE 50 MG/ML
25 INJECTION, SOLUTION INTRAMUSCULAR; INTRAVENOUS
Status: CANCELLED | OUTPATIENT
Start: 2018-10-30 | End: 2018-10-31

## 2018-10-22 RX ORDER — SODIUM CHLORIDE 9 MG/ML
75 INJECTION, SOLUTION INTRAVENOUS CONTINUOUS
Status: CANCELLED | OUTPATIENT
Start: 2018-10-30

## 2018-10-22 NOTE — TELEPHONE ENCOUNTER
Patient would like to know if she has any restrictions or limitations until her catherization. Patient can be reached at [phone].      Thanks

## 2018-10-22 NOTE — TELEPHONE ENCOUNTER
Identifiers x 2. Patient inquiring to walking as form of exercise. Instructed to continue with ADLs, but no exercise until after cardiac catheterization. Informed of procedure time change of 2:30pm.  To arrive at outpatient registration at 12:30pm.  Nothing to eat or drink past midnight. May hold lasix that am.  No driving for 24 hours after procedure. Verbalized understanding of the above. Encouraged to call regarding further questions or concerns.

## 2018-10-24 ENCOUNTER — HOSPITAL ENCOUNTER (OUTPATIENT)
Dept: GENERAL RADIOLOGY | Age: 53
Discharge: HOME | End: 2018-10-24
Attending: SPECIALIST
Payer: COMMERCIAL

## 2018-10-24 DIAGNOSIS — I42.0 DILATED CARDIOMYOPATHY (HCC): ICD-10-CM

## 2018-10-24 DIAGNOSIS — E78.5 HYPERLIPIDEMIA, UNSPECIFIED HYPERLIPIDEMIA TYPE: ICD-10-CM

## 2018-10-24 DIAGNOSIS — Z95.0 S/P PLACEMENT OF CARDIAC PACEMAKER: ICD-10-CM

## 2018-10-24 DIAGNOSIS — R94.31 NONSPECIFIC ABNORMAL ELECTROCARDIOGRAM (ECG) (EKG): ICD-10-CM

## 2018-10-24 DIAGNOSIS — E66.01 OBESITY, MORBID (HCC): ICD-10-CM

## 2018-10-24 DIAGNOSIS — R93.1 ABNORMAL ECHOCARDIOGRAM: ICD-10-CM

## 2018-10-24 DIAGNOSIS — R07.9 CHEST PAIN, UNSPECIFIED TYPE: ICD-10-CM

## 2018-10-24 LAB
BASOPHILS # BLD AUTO: 0 X10E3/UL (ref 0–0.2)
BASOPHILS NFR BLD AUTO: 0 %
BUN SERPL-MCNC: 13 MG/DL (ref 6–24)
BUN/CREAT SERPL: 18 (ref 9–23)
CALCIUM SERPL-MCNC: 9.2 MG/DL (ref 8.7–10.2)
CHLORIDE SERPL-SCNC: 102 MMOL/L (ref 96–106)
CO2 SERPL-SCNC: 24 MMOL/L (ref 20–29)
CREAT SERPL-MCNC: 0.73 MG/DL (ref 0.57–1)
EOSINOPHIL # BLD AUTO: 0.1 X10E3/UL (ref 0–0.4)
EOSINOPHIL NFR BLD AUTO: 1 %
ERYTHROCYTE [DISTWIDTH] IN BLOOD BY AUTOMATED COUNT: 13.9 % (ref 12.3–15.4)
GLUCOSE SERPL-MCNC: 109 MG/DL (ref 65–99)
HCT VFR BLD AUTO: 40.1 % (ref 34–46.6)
HGB BLD-MCNC: 13.5 G/DL (ref 11.1–15.9)
IMM GRANULOCYTES # BLD: 0 X10E3/UL (ref 0–0.1)
IMM GRANULOCYTES NFR BLD: 0 %
LYMPHOCYTES # BLD AUTO: 2.7 X10E3/UL (ref 0.7–3.1)
LYMPHOCYTES NFR BLD AUTO: 35 %
MCH RBC QN AUTO: 30.4 PG (ref 26.6–33)
MCHC RBC AUTO-ENTMCNC: 33.7 G/DL (ref 31.5–35.7)
MCV RBC AUTO: 90 FL (ref 79–97)
MONOCYTES # BLD AUTO: 0.8 X10E3/UL (ref 0.1–0.9)
MONOCYTES NFR BLD AUTO: 10 %
NEUTROPHILS # BLD AUTO: 4 X10E3/UL (ref 1.4–7)
NEUTROPHILS NFR BLD AUTO: 54 %
PLATELET # BLD AUTO: 320 X10E3/UL (ref 150–379)
POTASSIUM SERPL-SCNC: 4.1 MMOL/L (ref 3.5–5.2)
RBC # BLD AUTO: 4.44 X10E6/UL (ref 3.77–5.28)
SODIUM SERPL-SCNC: 142 MMOL/L (ref 134–144)
WBC # BLD AUTO: 7.6 X10E3/UL (ref 3.4–10.8)

## 2018-10-24 PROCEDURE — 71046 X-RAY EXAM CHEST 2 VIEWS: CPT

## 2018-10-30 ENCOUNTER — HOSPITAL ENCOUNTER (OUTPATIENT)
Dept: CARDIAC CATH/INVASIVE PROCEDURES | Age: 53
Discharge: HOME | End: 2018-10-30
Attending: SPECIALIST | Admitting: SPECIALIST
Payer: COMMERCIAL

## 2018-10-30 VITALS
SYSTOLIC BLOOD PRESSURE: 149 MMHG | OXYGEN SATURATION: 84 % | BODY MASS INDEX: 44.52 KG/M2 | HEIGHT: 66 IN | RESPIRATION RATE: 21 BRPM | TEMPERATURE: 98.6 F | DIASTOLIC BLOOD PRESSURE: 95 MMHG | WEIGHT: 277 LBS | HEART RATE: 86 BPM

## 2018-10-30 DIAGNOSIS — R07.9 CHEST PAIN, UNSPECIFIED TYPE: ICD-10-CM

## 2018-10-30 PROCEDURE — 74011250637 HC RX REV CODE- 250/637: Performed by: SPECIALIST

## 2018-10-30 PROCEDURE — 74011250636 HC RX REV CODE- 250/636: Performed by: SPECIALIST

## 2018-10-30 PROCEDURE — 74011636320 HC RX REV CODE- 636/320: Performed by: SPECIALIST

## 2018-10-30 PROCEDURE — C1894 INTRO/SHEATH, NON-LASER: HCPCS

## 2018-10-30 PROCEDURE — 77030004533 HC CATH ANGI DX IMP BSC -B

## 2018-10-30 PROCEDURE — 99152 MOD SED SAME PHYS/QHP 5/>YRS: CPT

## 2018-10-30 RX ORDER — LANOLIN ALCOHOL/MO/W.PET/CERES
1000 CREAM (GRAM) TOPICAL DAILY
COMMUNITY
End: 2020-03-10

## 2018-10-30 RX ORDER — HEPARIN SODIUM 1000 [USP'U]/ML
10000 INJECTION, SOLUTION INTRAVENOUS; SUBCUTANEOUS
Status: DISCONTINUED | OUTPATIENT
Start: 2018-10-30 | End: 2018-10-30

## 2018-10-30 RX ORDER — PRASUGREL 10 MG/1
10-60 TABLET, FILM COATED ORAL AS NEEDED
Status: DISCONTINUED | OUTPATIENT
Start: 2018-10-30 | End: 2018-10-30

## 2018-10-30 RX ORDER — IBUPROFEN 200 MG
200 TABLET ORAL
COMMUNITY

## 2018-10-30 RX ORDER — FENTANYL CITRATE 50 UG/ML
25-200 INJECTION, SOLUTION INTRAMUSCULAR; INTRAVENOUS
Status: DISCONTINUED | OUTPATIENT
Start: 2018-10-30 | End: 2018-10-30

## 2018-10-30 RX ORDER — ATROPINE SULFATE 0.1 MG/ML
1 INJECTION INTRAVENOUS AS NEEDED
Status: DISCONTINUED | OUTPATIENT
Start: 2018-10-30 | End: 2018-10-30

## 2018-10-30 RX ORDER — LIDOCAINE HYDROCHLORIDE 10 MG/ML
10-30 INJECTION, SOLUTION EPIDURAL; INFILTRATION; INTRACAUDAL; PERINEURAL ONCE
Status: COMPLETED | OUTPATIENT
Start: 2018-10-30 | End: 2018-10-30

## 2018-10-30 RX ORDER — GLUCOSAMINE/CHONDR SU A SOD 750-600 MG
1 TABLET ORAL DAILY
COMMUNITY
End: 2020-03-10

## 2018-10-30 RX ORDER — SODIUM CHLORIDE 9 MG/ML
3 INJECTION, SOLUTION INTRAVENOUS CONTINUOUS
Status: DISCONTINUED | OUTPATIENT
Start: 2018-10-30 | End: 2018-10-30

## 2018-10-30 RX ORDER — MIDAZOLAM HYDROCHLORIDE 1 MG/ML
.5-1 INJECTION, SOLUTION INTRAMUSCULAR; INTRAVENOUS
Status: DISCONTINUED | OUTPATIENT
Start: 2018-10-30 | End: 2018-10-30

## 2018-10-30 RX ORDER — CLOPIDOGREL 300 MG/1
600 TABLET, FILM COATED ORAL AS NEEDED
Status: DISCONTINUED | OUTPATIENT
Start: 2018-10-30 | End: 2018-10-30

## 2018-10-30 RX ORDER — FLUTICASONE PROPIONATE 50 MCG
2 SPRAY, SUSPENSION (ML) NASAL AS NEEDED
COMMUNITY

## 2018-10-30 RX ORDER — SODIUM CHLORIDE 9 MG/ML
1.5 INJECTION, SOLUTION INTRAVENOUS CONTINUOUS
Status: DISPENSED | OUTPATIENT
Start: 2018-10-30 | End: 2018-10-30

## 2018-10-30 RX ORDER — ACETAMINOPHEN 325 MG/1
650 TABLET ORAL
Status: DISCONTINUED | OUTPATIENT
Start: 2018-10-30 | End: 2018-10-30 | Stop reason: HOSPADM

## 2018-10-30 RX ORDER — SODIUM CHLORIDE 0.9 % (FLUSH) 0.9 %
10 SYRINGE (ML) INJECTION AS NEEDED
Status: DISCONTINUED | OUTPATIENT
Start: 2018-10-30 | End: 2018-10-30

## 2018-10-30 RX ORDER — SODIUM CHLORIDE 9 MG/ML
1000 INJECTION, SOLUTION INTRAVENOUS CONTINUOUS
Status: DISCONTINUED | OUTPATIENT
Start: 2018-10-30 | End: 2018-10-30 | Stop reason: HOSPADM

## 2018-10-30 RX ORDER — DIPHENHYDRAMINE HYDROCHLORIDE 50 MG/ML
25 INJECTION, SOLUTION INTRAMUSCULAR; INTRAVENOUS
Status: DISCONTINUED | OUTPATIENT
Start: 2018-10-30 | End: 2018-10-30 | Stop reason: HOSPADM

## 2018-10-30 RX ORDER — SODIUM CHLORIDE 9 MG/ML
75 INJECTION, SOLUTION INTRAVENOUS CONTINUOUS
Status: DISCONTINUED | OUTPATIENT
Start: 2018-10-30 | End: 2018-10-30 | Stop reason: HOSPADM

## 2018-10-30 RX ORDER — HEPARIN SODIUM 200 [USP'U]/100ML
1000 INJECTION, SOLUTION INTRAVENOUS AS NEEDED
Status: DISCONTINUED | OUTPATIENT
Start: 2018-10-30 | End: 2018-10-30

## 2018-10-30 RX ADMIN — SODIUM CHLORIDE 3 ML/KG/HR: 900 INJECTION, SOLUTION INTRAVENOUS at 14:45

## 2018-10-30 RX ADMIN — LIDOCAINE HYDROCHLORIDE 10 ML: 10 INJECTION, SOLUTION EPIDURAL; INFILTRATION; INTRACAUDAL; PERINEURAL at 15:43

## 2018-10-30 RX ADMIN — IOPAMIDOL 78 ML: 755 INJECTION, SOLUTION INTRAVENOUS at 15:58

## 2018-10-30 RX ADMIN — MIDAZOLAM 2 MG: 1 INJECTION INTRAMUSCULAR; INTRAVENOUS at 15:43

## 2018-10-30 RX ADMIN — FENTANYL CITRATE 50 MCG: 50 INJECTION, SOLUTION INTRAMUSCULAR; INTRAVENOUS at 15:43

## 2018-10-30 RX ADMIN — MIDAZOLAM 2 MG: 1 INJECTION INTRAMUSCULAR; INTRAVENOUS at 15:45

## 2018-10-30 RX ADMIN — MIDAZOLAM 1 MG: 1 INJECTION INTRAMUSCULAR; INTRAVENOUS at 16:02

## 2018-10-30 RX ADMIN — ACETAMINOPHEN 650 MG: 325 TABLET ORAL at 19:33

## 2018-10-30 RX ADMIN — HEPARIN SODIUM 2000 UNITS: 200 INJECTION, SOLUTION INTRAVENOUS at 15:43

## 2018-10-30 RX ADMIN — FENTANYL CITRATE 50 MCG: 50 INJECTION, SOLUTION INTRAMUSCULAR; INTRAVENOUS at 16:03

## 2018-10-30 NOTE — PROGRESS NOTES
Cardiac Cath Lab Procedure Area Arrival Note: 
 
Felicia Yu arrived to Cardiac Cath Lab, Procedure Area. Patient identifiers verified with NAME and DATE OF BIRTH. Procedure verified with patient. Consent forms verified. Allergies verified. Patient informed of procedure and plan of care. Questions answered with review. Patient voiced understanding of procedure and plan of care. Patient on cardiac monitor, non-invasive blood pressure, SPO2 monitor. On O2 @ 2 lpm via nasal cannula. IV of normal saline on pump at 377 ml/hr. Patient status doing well without problems. Patient is A&Ox 4. Patient reports no pain. Patient medicated during procedure with orders obtained and verified by Dr. Ana Monaco. Refer to patients Cardiac Cath Lab PROCEDURE REPORT for vital signs, assessment, status, and response during procedure, printed at end of case. Printed report on chart or scanned into chart.

## 2018-10-30 NOTE — PROGRESS NOTES
Transfer to [facility] from Procedure Area Verbal report given to Jovan Hartford on Ace Zepeda being transferred to Cardiac Cath Lab  for routine progression of care   Patient is post left heart cath procedure. Patient stable upon transfer to . Report consisted of patients Situation, Background, Assessment and  
Recommendations(SBAR). Information from the following report(s) Kardex, Procedure Summary, Intake/Output, MAR and Recent Results was reviewed with the receiving nurse. Opportunity for questions and clarification was provided. Patient medicated during procedure with orders obtained and verified by Dr. Jany Naidu. Refer to patient PROCEDURE REPORT for vital signs, assessment, status, and response during procedure.

## 2018-10-30 NOTE — PROCEDURES
Cardiac Catheterization Procedure Note   Patient: Stuart Levy  MRN: [id number]  SSN: [id number]   YOB: 1965 Age: 46 y.o.   Sex: female    Date of Procedure: 10/30/2018   Pre-procedure Diagnosis: Typical Angina  Post-procedure Diagnosis: Non-cardiac Chest Pain  Procedure: Left Heart Cath  :  Dr. Telma Rodriguez MD    Assistant(s):  None  Anesthesia: Moderate Sedation   Estimated Blood Loss: Less than 10 mL   Specimens Removed: None  Findings: LM: normal  LAD: normal  CX: codominant normal  RCA: codominant normal  LV EF 45-50%  Medical rx  Complications: None   Implants:  None  Signed by:  Telma Rodriguez MD  10/30/2018  4:08 PM

## 2018-10-31 ENCOUNTER — TELEPHONE (OUTPATIENT)
Dept: CARDIOLOGY CLINIC | Age: 53
End: 2018-10-31

## 2018-10-31 NOTE — PROGRESS NOTES
TRANSFER - IN REPORT: 
 
Verbal report received from [facility]. on Cynthia Wang  being received from procedure for routine progression of care. Report consisted of patients Situation, Background, Assessment and Recommendations(SBAR). Information from the following report(s) Procedure Summary, MAR and Recent Results was reviewed with the receiving clinician. Opportunity for questions and clarification was provided. Assessment completed upon patients arrival to [address] [place] [address] and care assumed. Cardiac Cath Lab Recovery Arrival Note: 
 
Cynthia Wang arrived to [facility] recovery area. Patient procedure= LHC. Patient on cardiac monitor, non-invasive blood pressure, SPO2 monitor. On  O2 @ 2 lpm via n/c. IV  of nacl on pump at 188 ml/hr. Patient status doing well without problems. Patient is A&Ox 4. Patient reports no complaints. PROCEDURE SITE CHECK: 
 
Procedure site:without bleeding and or hematoma, no pain/discomfort reported at procedure site. No change in patient status. Continue to monitor patient and status. Dr Anali Wagoner talked with pt's

## 2018-10-31 NOTE — PROGRESS NOTES
Cardiac Cath Lab Recovery Arrival Note: 
 
 
Hakeem Teixeira arrived to Cardiac Cath Lab, Recovery Area. Staff introduced to patient. Patient identifiers verified with NAME and DATE OF BIRTH. Procedure verified with patient. Consent forms reviewed and signed by patient or authorized representative and verified. Allergies verified. Patient and family oriented to department. Patient and family informed of procedure and plan of care. Questions answered with review. Patient prepped for procedure, per orders from physician, prior to arrival. 
 
Patient on cardiac monitor, non-invasive blood pressure, SPO2 monitor. On room air. Patient is A&Ox 4. Patient reports no complaints. Patient in stretcher, in low position, with side rails up, call bell within reach, patient instructed to call if assistance as needed. Patient prep in: [address], [place]. Patient family has pager # [phone] Family in:  in hospital.  
Prep by: Ellen Leigh RN 
 
Pre cath teaching completed

## 2018-10-31 NOTE — PROGRESS NOTES
Ambulated 100 ft, gait steady. Voided, back to stretcher right groin dsg D&I Assisted with dressing. 2000  Discharged via w/c with , instructions and belongings

## 2018-10-31 NOTE — TELEPHONE ENCOUNTER
Per Francesco hagen for patient to come back in 2 weeks. Renny Belcher will be calling the patient for appt.

## 2018-11-12 ENCOUNTER — OFFICE VISIT (OUTPATIENT)
Dept: CARDIOLOGY CLINIC | Age: 53
End: 2018-11-12

## 2018-11-12 VITALS
RESPIRATION RATE: 16 BRPM | HEART RATE: 96 BPM | WEIGHT: 286 LBS | HEIGHT: 66 IN | DIASTOLIC BLOOD PRESSURE: 90 MMHG | OXYGEN SATURATION: 98 % | SYSTOLIC BLOOD PRESSURE: 130 MMHG | BODY MASS INDEX: 45.96 KG/M2

## 2018-11-12 DIAGNOSIS — I42.0 DILATED CARDIOMYOPATHY (HCC): Primary | ICD-10-CM

## 2018-11-12 DIAGNOSIS — Z95.0 S/P PLACEMENT OF CARDIAC PACEMAKER: ICD-10-CM

## 2018-11-12 DIAGNOSIS — R94.31 NONSPECIFIC ABNORMAL ELECTROCARDIOGRAM (ECG) (EKG): ICD-10-CM

## 2018-11-12 DIAGNOSIS — I44.2 COMPLETE HEART BLOCK (HCC): ICD-10-CM

## 2018-11-12 RX ORDER — LOSARTAN POTASSIUM 25 MG/1
25 TABLET ORAL DAILY
COMMUNITY
End: 2018-11-12 | Stop reason: SDUPTHER

## 2018-11-12 RX ORDER — LOSARTAN POTASSIUM 25 MG/1
25 TABLET ORAL DAILY
Qty: 30 TAB | Refills: 6 | Status: SHIPPED | OUTPATIENT
Start: 2018-11-12 | End: 2019-06-13 | Stop reason: SDUPTHER

## 2018-11-12 NOTE — PROGRESS NOTES
Blood pressure 130/90, pulse 96, resp. rate 16, height 5' 6\" (1.676 m), weight 286 lb (129.7 kg), SpO2 98 %.

## 2018-11-12 NOTE — PATIENT INSTRUCTIONS
Start Losartan 25 mg daily    Have blood work drawn in 1 week     Follow up with Kayley Ibarra in 6 months

## 2018-11-12 NOTE — PROGRESS NOTES
HISTORY OF PRESENT ILLNESS  Teresita Shannon is a 46 y.o. female. previously followed by Dr. Daniel Viera. She has a history of hypertension and previous moderate left ventricular systolic dysfunction by echocardiography. She underwent a left heart cardiac catheterization following an abnormal stress test. This apparently occurred at [facility] in 2006, and revealed no evidence of significant coronary obstructive disease. Her ejection fraction eventually improved. Date of previous stress test was 4/2007. This was a Persantine Myoview stress test. No chest pain was reported. Indeterminate electrocardiographic changes. There were no evidences for gross ischemia. There was fixed defect at the level of the anterior wall for which tissue attenuation could be responsible. Ejection fraction was estimated at 52%.   echocardiogram on 3/2007. Normal left ventricular systolic function with an ejection fraction approximately of 50%, mild left ventricular hypertrophy, trivial aortic insufficiency and mild mitral regurgitation. Stress echo on 2010 no ischemia or MI and EF at rest 50%  Echo on 2/14:Systolic function was at the lower limits of normal.  Ejection fraction was estimated to be 45 % in the range of 40 % to 50 %. There was mild diffuse hypokinesis. Left atrium: The atrium was mildly dilated. Tricuspid valve: There was mild regurgitation. PPM for CHB on 2/14 with Dr. Anand Kohler, unfortunately leads dislodge next day and patient was taken to [facility] after syncopal episode and second PPM placed  Echo on 11/15:Systolic function was mildly reduced by EF (biplane method  of disks). Ejection fraction was estimated to be 44 %. There was mild  diffuse hypokinesis. There was moderate hypokinesis of the mid-apical  anterior, mid-apical inferior, and apical lateral wall(s). Doppler  parameters were consistent with abnormal left ventricular relaxation  (grade 1 diastolic dysfunction).     Left atrium: The atrium was mildly dilated. ECHO on 6/16:Left ventricle: Size was normal. Systolic function was by EF (biplane  method of disks). Ejection fraction was estimated to be 44 %. There was  moderate hypokinesis of the mid-apical anterior wall(s). Wall thickness  was normal.  COMPARISONS:  There has been no significant change. Comparison was made with the  previous study of 06-Nov-2015. ECHO on 7/17:Systolic function was at the lower limits of normal.  Ejection fraction was estimated in the range of 50 % to 55 %. There were  no regional wall motion abnormalities. COMPARISONS:  Comparison was made with the previous study of 07-Jun-2016. LV overall  function has increased from 44 % to 53 %. Echo on 8/18:Systolic function was at the lower limits of normal.  Ejection fraction was estimated to be 50 %. There was hypokinesis of the  mid-apical inferior, apical septal, and apical lateral wall(s). Wall  thickness was mildly increased. Aortic valve: The valve was trileaflet. Leaflets exhibited sclerosis. Comparisons: LV overall function has not changed. RWMA now noted  Nuclear stress test on 10/18:Myocardial perfusion imaging is abnormal: there is a small area of infarction in the distal inferior wall. No gross ischemia noted.  Also attenuation/pacemaker artifact likely present at the level of anterior wall  Overall left ventricular systolic function was normal. EF 54%  Cardiac  Catheterization on 10/30/18:LM: normal  LAD: normal  CX: codominant normal  RCA: codominant normal  LV EF 45-50%              Past Medical History:   Diagnosis Date    Essential hypertension      Left ventricular dysfunction                  Past Surgical History:   Procedure Laterality Date    CARDIAC SURG PROCEDURE UNLIST         cath 2005    HX ORTHOPAEDIC   1990     arthroscopic surgery right knee    INS PPM/ICD LED DUAL ONLY   2/12/2014                       Family History   Problem Relation Age of Onset    Stroke Mother        HPI  She is doing well post cath no complaints at all  Review of Systems   Respiratory: Negative. Cardiovascular: Negative. Physical Exam   Neck: Carotid bruit is not present. Cardiovascular: Normal rate and regular rhythm. Pulmonary/Chest: Effort normal and breath sounds normal.   Abdominal: Soft. Musculoskeletal: She exhibits no edema. Right groin with no swelling bleeding hematoma and preserved pulses   Psychiatric: She has a normal mood and affect. ASSESSMENT and PLAN  CMP: mild , non ischemic, mild lv dysfunction , possibly multifactorial. clinically it seems compensated, stage B NYHA class 1  Her ECG showed NSR and V paced.   Discussed results of cardiac catheterization with NO CAD          Continue coreg, continue  lasix 20 mg and potassium 10 meq. Discussed at length GDMT   Add losartan 25 mg daily  Side effects explained       echo surveillance      Obesity:We have discussed at length, need for exercise and loose weight and different ways to do it     PPM: continue routine surveillance with Dr. Sandra Ceballos to determine pacing percentage.  Discussed cmp associated to PPM and at times potential  need for crt     HTN: relatively controlled starting losartan BMP in 1 week  Low salt diet     HLD: closely followed by her pcp     See her back  in 6 months or sooner if any issues

## 2018-11-27 LAB
BUN SERPL-MCNC: 18 MG/DL (ref 6–24)
BUN/CREAT SERPL: 19 (ref 9–23)
CALCIUM SERPL-MCNC: 9.2 MG/DL (ref 8.7–10.2)
CHLORIDE SERPL-SCNC: 101 MMOL/L (ref 96–106)
CO2 SERPL-SCNC: 25 MMOL/L (ref 20–29)
CREAT SERPL-MCNC: 0.97 MG/DL (ref 0.57–1)
GLUCOSE SERPL-MCNC: 134 MG/DL (ref 65–99)
POTASSIUM SERPL-SCNC: 3.8 MMOL/L (ref 3.5–5.2)
SODIUM SERPL-SCNC: 140 MMOL/L (ref 134–144)

## 2018-12-10 RX ORDER — CARVEDILOL 6.25 MG/1
TABLET ORAL
Qty: 60 TAB | Refills: 3 | Status: SHIPPED | OUTPATIENT
Start: 2018-12-10 | End: 2019-04-10 | Stop reason: SDUPTHER

## 2019-02-12 DIAGNOSIS — I42.9 CARDIOMYOPATHY (HCC): ICD-10-CM

## 2019-02-12 DIAGNOSIS — I10 ESSENTIAL HYPERTENSION, BENIGN: ICD-10-CM

## 2019-02-13 RX ORDER — POTASSIUM CHLORIDE 750 MG/1
CAPSULE, EXTENDED RELEASE ORAL
Qty: 30 CAP | Refills: 6 | Status: SHIPPED | OUTPATIENT
Start: 2019-02-13 | End: 2019-09-18 | Stop reason: SDUPTHER

## 2019-07-15 DIAGNOSIS — I42.9 CARDIOMYOPATHY (HCC): ICD-10-CM

## 2019-07-15 DIAGNOSIS — I10 ESSENTIAL HYPERTENSION, BENIGN: ICD-10-CM

## 2019-07-15 RX ORDER — FUROSEMIDE 20 MG/1
TABLET ORAL
Qty: 30 TAB | Refills: 1 | Status: SHIPPED | OUTPATIENT
Start: 2019-07-15 | End: 2019-09-18 | Stop reason: SDUPTHER

## 2019-08-19 RX ORDER — CARVEDILOL 6.25 MG/1
TABLET ORAL
Qty: 60 TAB | Refills: 3 | Status: SHIPPED | OUTPATIENT
Start: 2019-08-19 | End: 2019-12-14 | Stop reason: SDUPTHER

## 2019-09-18 DIAGNOSIS — I10 ESSENTIAL HYPERTENSION, BENIGN: ICD-10-CM

## 2019-09-18 DIAGNOSIS — I42.9 CARDIOMYOPATHY (HCC): ICD-10-CM

## 2019-09-18 RX ORDER — FUROSEMIDE 20 MG/1
TABLET ORAL
Qty: 30 TAB | Refills: 0 | Status: SHIPPED | OUTPATIENT
Start: 2019-09-18 | End: 2019-10-28 | Stop reason: SDUPTHER

## 2019-09-18 RX ORDER — POTASSIUM CHLORIDE 750 MG/1
CAPSULE, EXTENDED RELEASE ORAL
Qty: 30 CAP | Refills: 0 | Status: SHIPPED | OUTPATIENT
Start: 2019-09-18 | End: 2019-10-13 | Stop reason: SDUPTHER

## 2019-10-11 ENCOUNTER — OFFICE VISIT (OUTPATIENT)
Dept: CARDIOLOGY CLINIC | Age: 54
End: 2019-10-11

## 2019-10-11 ENCOUNTER — TELEPHONE (OUTPATIENT)
Dept: CARDIOLOGY CLINIC | Age: 54
End: 2019-10-11

## 2019-10-11 VITALS
BODY MASS INDEX: 45.32 KG/M2 | WEIGHT: 282 LBS | HEART RATE: 68 BPM | DIASTOLIC BLOOD PRESSURE: 88 MMHG | OXYGEN SATURATION: 97 % | SYSTOLIC BLOOD PRESSURE: 134 MMHG | RESPIRATION RATE: 14 BRPM | HEIGHT: 66 IN

## 2019-10-11 DIAGNOSIS — R42 DIZZINESS: ICD-10-CM

## 2019-10-11 DIAGNOSIS — I42.9 CARDIOMYOPATHY, UNSPECIFIED TYPE (HCC): Primary | ICD-10-CM

## 2019-10-11 RX ORDER — MECLIZINE HYDROCHLORIDE 25 MG/1
25 TABLET ORAL
Refills: 1 | COMMUNITY
Start: 2019-09-13

## 2019-10-11 NOTE — PROGRESS NOTES
Visit Vitals  /88 (BP 1 Location: Left arm, BP Patient Position: Sitting)   Pulse 68   Resp 14   Ht 5' 6\" (1.676 m)   Wt 282 lb (127.9 kg)   SpO2 97%   BMI 45.52 kg/m²

## 2019-10-11 NOTE — PROGRESS NOTES
HISTORY OF PRESENT ILLNESS  Natalia Sr is a 53 y.o. female. previously followed by Dr. Sunny Kenney. She has a history of hypertension and previous moderate left ventricular systolic dysfunction by echocardiography. She underwent a left heart cardiac catheterization following an abnormal stress test. This apparently occurred at [facility] in 2006, and revealed no evidence of significant coronary obstructive disease. Her ejection fraction eventually improved. Date of previous stress test was 4/2007. This was a Persantine Myoview stress test. No chest pain was reported. Indeterminate electrocardiographic changes. There were no evidences for gross ischemia. There was fixed defect at the level of the anterior wall for which tissue attenuation could be responsible. Ejection fraction was estimated at 52%.   echocardiogram on 3/2007. Normal left ventricular systolic function with an ejection fraction approximately of 50%, mild left ventricular hypertrophy, trivial aortic insufficiency and mild mitral regurgitation. Stress echo on 2010 no ischemia or MI and EF at rest 50%  Echo on 2/14:Systolic function was at the lower limits of normal.  Ejection fraction was estimated to be 45 % in the range of 40 % to 50 %. There was mild diffuse hypokinesis. Left atrium: The atrium was mildly dilated. Tricuspid valve: There was mild regurgitation. PPM for CHB on 2/14 with Dr. Stefani Elliott, unfortunately leads dislodge next day and patient was taken to [facility] after syncopal episode and second PPM placed  Echo on 11/15:Systolic function was mildly reduced by EF (biplane method  of disks). Ejection fraction was estimated to be 44 %. There was mild  diffuse hypokinesis. There was moderate hypokinesis of the mid-apical  anterior, mid-apical inferior, and apical lateral wall(s). Doppler  parameters were consistent with abnormal left ventricular relaxation  (grade 1 diastolic dysfunction). Left atrium:  The atrium was mildly dilated. ECHO on 6/16:Left ventricle: Size was normal. Systolic function was by EF (biplane  method of disks). Ejection fraction was estimated to be 44 %. There was  moderate hypokinesis of the mid-apical anterior wall(s). Wall thickness  was normal.  COMPARISONS:  There has been no significant change. Comparison was made with the  previous study of 06-Nov-2015. ECHO on 7/17:Systolic function was at the lower limits of normal.  Ejection fraction was estimated in the range of 50 % to 55 %. There were  no regional wall motion abnormalities. COMPARISONS:  Comparison was made with the previous study of 07-Jun-2016. LV overall  function has increased from 44 % to 53 %. Echo on 8/18:Systolic function was at the lower limits of normal.  Ejection fraction was estimated to be 50 %. There was hypokinesis of the  mid-apical inferior, apical septal, and apical lateral wall(s). Wall  thickness was mildly increased. Aortic valve: The valve was trileaflet. Leaflets exhibited sclerosis. Comparisons: LV overall function has not changed. RWMA now noted  Nuclear stress test on 10/18:Myocardial perfusion imaging is abnormal: there is a small area of infarction in the distal inferior wall. No gross ischemia noted.  Also attenuation/pacemaker artifact likely present at the level of anterior wall  Overall left ventricular systolic function was normal. EF 54%  Cardiac  Catheterization on 10/30/18:LM: normal  LAD: normal  CX: codominant normal  RCA: codominant normal  LV EF 45-50%                Past Medical History:   Diagnosis Date    Essential hypertension      Left ventricular dysfunction                  Past Surgical History:   Procedure Laterality Date    CARDIAC SURG PROCEDURE UNLIST         cath 2005    HX ORTHOPAEDIC   1990     arthroscopic surgery right knee    INS PPM/ICD LED DUAL ONLY   2/12/2014                       Family History   Problem Relation Age of Onset    Stroke Mother        HPI  Mostly c/o meniere issues such as dizziness   no cp or sob reported   no palpitations  Review of Systems   Constitutional: Negative. Respiratory: Negative. Cardiovascular: Negative. Neurological: Positive for dizziness. Visit Vitals  /88 (BP 1 Location: Left arm, BP Patient Position: Sitting)   Pulse 68   Resp 14   Ht 5' 6\" (1.676 m)   Wt 282 lb (127.9 kg)   SpO2 97%   BMI 45.52 kg/m²       Physical Exam   Neck: No JVD present. Carotid bruit is not present. Cardiovascular: Normal rate and regular rhythm. Pulmonary/Chest: Effort normal and breath sounds normal.   Abdominal: Soft. Musculoskeletal: She exhibits no edema. Psychiatric: She has a normal mood and affect. Current Outpatient Medications on File Prior to Visit   Medication Sig Dispense Refill    potassium chloride SA (MICRO-K) 10 mEq capsule TAKE ONE CAPSULE BY MOUTH EVERY DAY 30 Cap 0    carvedilol (COREG) 6.25 mg tablet TAKE 1 TABLET BY MOUTH TWICE A DAY 60 Tab 3    cyanocobalamin (VITAMIN B-12) 1,000 mcg tablet Take 1,000 mcg by mouth daily.  Biotin 2,500 mcg cap Take 1 Tab by mouth daily.  betahistine HCl (BETAHISTINE, BULK,) Take 8 mg by mouth two (2) times a day.  ibuprofen (MOTRIN) 200 mg tablet Take 200 mg by mouth every six (6) hours as needed for Pain.  PYRIDOXINE HCL, VITAMIN B6, (VITAMIN B-6 PO) Take  by mouth daily.  furosemide (LASIX) 20 mg tablet TAKE 1 TAB BY MOUTH DAILY. 30 Tab 3    aspirin 81 mg chewable tablet Take 81 mg by mouth daily.  meclizine (ANTIVERT) 25 mg tablet TAKE 1 TABLET BY MOUTH 3 TIMES A DAY AS NEEDED VERTIGO/DIZZINESS  1    furosemide (LASIX) 20 mg tablet TAKE 1 TABLET BY MOUTH EVERY DAY 30 Tab 0    losartan (COZAAR) 25 mg tablet TAKE 1 TABLET BY MOUTH EVERY DAY 30 Tab 3    fluticasone (FLONASE ALLERGY RELIEF) 50 mcg/actuation nasal spray 2 Sprays by Both Nostrils route as needed.        No current facility-administered medications on file prior to visit. ASSESSMENT and PLAN  1. Cardiomyopathy: She seems to be clinically compensated. Her cardiomyopathy is nonischemic. Likely in part related to her pacemaker. Her electrocardiogram reveals normal sinus rhythm and V paced. Her last evaluation of ejection fraction was in October 2018 which revealed ejection fraction of 45 to 50%. Continue Coreg, Lasix and losartan at this time. In my opinion we need to consider possible CRT. I have asked her to follow-up with her electrophysiologist in let me know if that is an option. If CRT is a possibility echocardiogram be obtained after that to reevaluate her ejection fraction. 2.  Permanent pacemaker placement: Continue routine surveillance with Dr. Delia Rowley. We need to determine the percentage of RV pacing in order to establish if CRT is an option. I will of course defer to electrophysiologist.    3.  Hypertension: This seems to be reasonably controlled. 4.  Hyperlipidemia: Not closely followed by her primary care physician.     See her back in 4 months with transthoracic echocardiogram.

## 2019-10-11 NOTE — TELEPHONE ENCOUNTER
Patient would like to let Dr Delphine Lyons know that she found her pacemaker doctor, Dr Elizabeth Shetty, and has an appointment on 10/21.      Phone: [phone]

## 2019-10-16 RX ORDER — LOSARTAN POTASSIUM 25 MG/1
TABLET ORAL
Qty: 30 TAB | Refills: 6 | Status: SHIPPED | OUTPATIENT
Start: 2019-10-16 | End: 2020-03-10

## 2019-10-18 ENCOUNTER — TELEPHONE (OUTPATIENT)
Dept: CARDIOLOGY CLINIC | Age: 54
End: 2019-10-18

## 2019-10-21 ENCOUNTER — TELEPHONE (OUTPATIENT)
Dept: CARDIOLOGY CLINIC | Age: 54
End: 2019-10-21

## 2019-10-21 NOTE — TELEPHONE ENCOUNTER
Patient states she would like to touch base with Dr. Radha Ochoa about what her pacemaker doctor told her today at her appt. She states doctor wants to do surgery to upgrade pacemaker. He asked patient to inform Dr. Radha Ochoa.      Phone: [phone]

## 2019-10-25 NOTE — TELEPHONE ENCOUNTER
Verified patient with two patient identifiers.   Spoke with patient and she said  will upgrade her pacer on Nov.7,2019 at [facility].

## 2019-10-28 DIAGNOSIS — I10 ESSENTIAL HYPERTENSION, BENIGN: ICD-10-CM

## 2019-10-28 DIAGNOSIS — I42.9 CARDIOMYOPATHY (HCC): ICD-10-CM

## 2019-10-28 RX ORDER — FUROSEMIDE 20 MG/1
TABLET ORAL
Qty: 30 TAB | Refills: 6 | Status: SHIPPED | OUTPATIENT
Start: 2019-10-28 | End: 2020-06-08

## 2019-12-17 RX ORDER — CARVEDILOL 6.25 MG/1
TABLET ORAL
Qty: 180 TAB | Refills: 1 | Status: SHIPPED | OUTPATIENT
Start: 2019-12-17 | End: 2020-06-08

## 2019-12-17 NOTE — TELEPHONE ENCOUNTER
Requested Prescriptions     Signed Prescriptions Disp Refills    carvedilol (COREG) 6.25 mg tablet 180 Tab 1     Sig: TAKE 1 TABLET BY MOUTH TWICE A DAY     Authorizing Provider: Jocelin Azar     Ordering User: Angelica Dejesus     Verbal order per Dr. Rosa M Irwin.      Future Appointments   Date Time Provider Nilda Lou   2/3/2020  8:00 AM [place]   2/10/2020  8:20 AM Asia Talley  [address]

## 2020-03-10 ENCOUNTER — OFFICE VISIT (OUTPATIENT)
Dept: CARDIOLOGY CLINIC | Age: 55
End: 2020-03-10

## 2020-03-10 VITALS
RESPIRATION RATE: 16 BRPM | WEIGHT: 291.2 LBS | DIASTOLIC BLOOD PRESSURE: 98 MMHG | SYSTOLIC BLOOD PRESSURE: 142 MMHG | HEART RATE: 69 BPM | HEIGHT: 66 IN | BODY MASS INDEX: 46.8 KG/M2 | OXYGEN SATURATION: 95 %

## 2020-03-10 DIAGNOSIS — R42 DIZZINESS: ICD-10-CM

## 2020-03-10 DIAGNOSIS — I10 ESSENTIAL HYPERTENSION, BENIGN: ICD-10-CM

## 2020-03-10 DIAGNOSIS — Z95.0 S/P PLACEMENT OF CARDIAC PACEMAKER: ICD-10-CM

## 2020-03-10 DIAGNOSIS — I42.9 CARDIOMYOPATHY, UNSPECIFIED TYPE (HCC): Primary | ICD-10-CM

## 2020-03-10 RX ORDER — LOSARTAN POTASSIUM 25 MG/1
50 TABLET ORAL DAILY
Qty: 180 TAB | Refills: 1 | Status: SHIPPED | OUTPATIENT
Start: 2020-03-10 | End: 2020-09-04

## 2020-03-10 NOTE — PATIENT INSTRUCTIONS
Increase losartan to 50 mg daily. Schedule blood pressure check in 1 week. Follow up with Dr. Stephens Ran in 6 months.

## 2020-03-10 NOTE — PROGRESS NOTES
Visit Vitals  BP (!) 140/100 (BP 1 Location: Left arm, BP Patient Position: Sitting)   Pulse 69   Resp 16   Ht 5' 6\" (1.676 m)   Wt 291 lb 3.2 oz (132.1 kg)   SpO2 95%   BMI 47.00 kg/m²     Has not taken carvedilol or lasix

## 2020-03-10 NOTE — PROGRESS NOTES
HISTORY OF PRESENT ILLNESS  Danielle Paniagua is a 53 y.o. female. previously followed by Dr. Tang Hunter. She has a history of hypertension and previous moderate left ventricular systolic dysfunction by echocardiography.      She underwent a left heart cardiac catheterization following an abnormal stress test. This apparently occurred at [facility] in 2006, and revealed no evidence of significant coronary obstructive disease. Her ejection fraction eventually improved.      Date of previous stress test was 4/2007. This was a Persantine Myoview stress test. No chest pain was reported. Indeterminate electrocardiographic changes. There were no evidences for gross ischemia. There was fixed defect at the level of the anterior wall for which tissue attenuation could be responsible. Ejection fraction was estimated at 52%.   echocardiogram on 3/2007. Normal left ventricular systolic function with an ejection fraction approximately of 50%, mild left ventricular hypertrophy, trivial aortic insufficiency and mild mitral regurgitation. Stress echo on 2010 no ischemia or MI and EF at rest 50%  Echo on 2/14:Systolic function was at the lower limits of normal.  Ejection fraction was estimated to be 45 % in the range of 40 % to 50 %. There was mild diffuse hypokinesis. Left atrium: The atrium was mildly dilated. Tricuspid valve: There was mild regurgitation. PPM for CHB on 2/14 with Dr. Diego Franklin, unfortunately leads dislodge next day and patient was taken to [facility] after syncopal episode and second PPM placed  Echo on 11/15:Systolic function was mildly reduced by EF (biplane method  of disks). Ejection fraction was estimated to be 44 %. There was mild  diffuse hypokinesis. There was moderate hypokinesis of the mid-apical  anterior, mid-apical inferior, and apical lateral wall(s). Doppler  parameters were consistent with abnormal left ventricular relaxation  (grade 1 diastolic dysfunction). Left atrium:  The atrium was mildly dilated. ECHO on 6/16:Left ventricle: Size was normal. Systolic function was by EF (biplane  method of disks). Ejection fraction was estimated to be 44 %. There was  moderate hypokinesis of the mid-apical anterior wall(s). Wall thickness  was normal.  COMPARISONS:  There has been no significant change. Comparison was made with the  previous study of 06-Nov-2015. ECHO on 7/17:Systolic function was at the lower limits of normal.  Ejection fraction was estimated in the range of 50 % to 55 %. There were  no regional wall motion abnormalities. COMPARISONS:  Comparison was made with the previous study of 07-Jun-2016. LV overall  function has increased from 44 % to 53 %. Echo on 8/18:Systolic function was at the lower limits of normal.  Ejection fraction was estimated to be 50 %. There was hypokinesis of the  mid-apical inferior, apical septal, and apical lateral wall(s). Wall  thickness was mildly increased. Aortic valve: The valve was trileaflet. Leaflets exhibited sclerosis. Comparisons: LV overall function has not changed. RWMA now noted  Nuclear stress test on 10/18:Myocardial perfusion imaging is abnormal: there is a small area of infarction in the distal inferior wall. No gross ischemia noted. Also attenuation/pacemaker artifact likely present at the level of anterior wall  Overall left ventricular systolic function was normal. EF 54%  Cardiac  Catheterization on 10/30/18:LM: normal  LAD: normal  CX: codominant normal  RCA: codominant normal  LV EF 45-50%    02/03/20   ECHO ADULT COMPLETE 02/04/2020 2/4/2020    Narrative · Mildly dilated left ventricle. Mild concentric hypertrophy. Low normal   systolic dysfunction. Estimated left ventricular ejection fraction is 50 -   55%. Abnormal left ventricular wall motion. Abnormal left ventricular   septal motion consistent with right ventricular pacing. Left ventricular   diastolic dysfunction. · Moderately dilated left atrium.   · Pacing wire present  · Probably trileaflet aortic valve. · Trace mitral valve regurgitation is present. · Mild tricuspid valve regurgitation is present. · There is no evidence of pulmonary hypertension. Signed by: Marielena Mora MD                  Past Medical History:   Diagnosis Date    Essential hypertension      Left ventricular dysfunction                  Past Surgical History:   Procedure Laterality Date    CARDIAC SURG PROCEDURE UNLIST         cath 2005    HX ORTHOPAEDIC   1990     arthroscopic surgery right knee    INS PPM/ICD LED DUAL ONLY   2/12/2014                       Family History   Problem Relation Age of Onset    Stroke Mother           HPI  She is ok cardiac wise but really aaffected by her Meniere's disease with dizziness and nausea  Review of Systems   Respiratory: Negative. Cardiovascular: Negative. Neurological: Positive for dizziness. Visit Vitals  BP (!) 140/100 (BP 1 Location: Left arm, BP Patient Position: Sitting)   Pulse 69   Resp 16   Ht 5' 6\" (1.676 m)   Wt 291 lb 3.2 oz (132.1 kg)   SpO2 95%   BMI 47.00 kg/m²     Wt Readings from Last 3 Encounters:   03/10/20 291 lb 3.2 oz (132.1 kg)   02/03/20 288 lb (130.6 kg)   10/11/19 282 lb (127.9 kg)       Physical Exam   Neck: No JVD present. Carotid bruit is not present. Cardiovascular: Normal rate and regular rhythm. Pulmonary/Chest: Effort normal and breath sounds normal.   Abdominal: Soft. Musculoskeletal:         General: No edema. Psychiatric: She has a normal mood and affect.        Current Outpatient Medications on File Prior to Visit   Medication Sig Dispense Refill    carvedilol (COREG) 6.25 mg tablet TAKE 1 TABLET BY MOUTH TWICE A  Tab 1    furosemide (LASIX) 20 mg tablet TAKE 1 TABLET BY MOUTH EVERY DAY 30 Tab 6    losartan (COZAAR) 25 mg tablet TAKE 1 TABLET BY MOUTH EVERY DAY 30 Tab 6    potassium chloride SA (MICRO-K) 10 mEq capsule TAKE 1 CAPSULE BY MOUTH EVERY DAY 30 Cap 6    meclizine (ANTIVERT) 25 mg tablet Take 25 mg by mouth three (3) times daily as needed for Dizziness. 1    betahistine HCl (BETAHISTINE, BULK,) Take 8 mg by mouth two (2) times a day.  ibuprofen (MOTRIN) 200 mg tablet Take 200 mg by mouth every six (6) hours as needed for Pain.  fluticasone (FLONASE ALLERGY RELIEF) 50 mcg/actuation nasal spray 2 Sprays by Both Nostrils route as needed.  PYRIDOXINE HCL, VITAMIN B6, (VITAMIN B-6 PO) Take  by mouth daily.  aspirin 81 mg chewable tablet Take 81 mg by mouth daily. No current facility-administered medications on file prior to visit. ASSESSMENT and PLAN  1. Cardiomyopathy: She seems to be clinically compensated. Her cardiomyopathy is nonischemic. Likely in part related to her pacemaker. Her latest  electrocardiogram reveals normal sinus rhythm and V paced. Her last evaluation of ejection fraction was in 2/20  which revealed ejection fraction of 50-55%. Continue Coreg, Lasix and losartan at this time but use lasix on a prn base (apparently it is worsening her meniere's symptoms)     CRT on hold for now     2.  Permanent pacemaker placement: Continue routine surveillance with Dr. Kaycee Patterson. We need to determine the percentage of RV pacing in order to establish if CRT is an option. I will of course defer to electrophysiologist.     3. Hypertension: elevated. Increase cozaar to 50 mg daily and bp check in 1 week     4. Hyperlipidemia:  closely followed by her primary care physician. 5. Meniere: closely followed by ENT     See her back in 6 months .

## 2020-03-17 ENCOUNTER — CLINICAL SUPPORT (OUTPATIENT)
Dept: CARDIOLOGY CLINIC | Age: 55
End: 2020-03-17

## 2020-03-17 ENCOUNTER — TELEPHONE (OUTPATIENT)
Dept: CARDIOLOGY CLINIC | Age: 55
End: 2020-03-17

## 2020-03-17 VITALS — SYSTOLIC BLOOD PRESSURE: 120 MMHG | DIASTOLIC BLOOD PRESSURE: 80 MMHG

## 2020-03-17 DIAGNOSIS — I10 ESSENTIAL HYPERTENSION, BENIGN: ICD-10-CM

## 2020-03-17 DIAGNOSIS — I42.9 CARDIOMYOPATHY (HCC): ICD-10-CM

## 2020-03-17 DIAGNOSIS — I10 ESSENTIAL HYPERTENSION, BENIGN: Primary | ICD-10-CM

## 2020-03-17 RX ORDER — LOSARTAN POTASSIUM 25 MG/1
TABLET ORAL
Qty: 30 TAB | Refills: 6 | OUTPATIENT
Start: 2020-03-17

## 2020-03-17 RX ORDER — POTASSIUM CHLORIDE 750 MG/1
CAPSULE, EXTENDED RELEASE ORAL
Qty: 90 CAP | Refills: 1 | Status: SHIPPED | OUTPATIENT
Start: 2020-03-17 | End: 2020-09-04

## 2020-03-17 NOTE — PATIENT INSTRUCTIONS
Continue same medications. Keep follow up: 
 
Future Appointments Date Time Provider Nilda Lou 9/14/2020  8:20 AM Anni Begum  [address]

## 2020-03-17 NOTE — TELEPHONE ENCOUNTER
Requested Prescriptions     Signed Prescriptions Disp Refills    potassium chloride SA (MICRO-K) 10 mEq capsule 90 Cap 1     Sig: TAKE 1 CAPSULE BY MOUTH EVERY DAY     Authorizing Provider: Loyda Reveles     Ordering User: RJ Guan     Refused Prescriptions Disp Refills    losartan (COZAAR) 25 mg tablet [Pharmacy Med Name: LOSARTAN POTASSIUM 25 MG TAB] 30 Tab 6     Sig: TAKE 1 TABLET BY MOUTH EVERY DAY     Refused By: Landry THAO     Reason for Refusal: Request already responded to by other means (e.g. phone or fax)

## 2020-06-08 DIAGNOSIS — I42.9 CARDIOMYOPATHY (HCC): ICD-10-CM

## 2020-06-08 DIAGNOSIS — I10 ESSENTIAL HYPERTENSION, BENIGN: ICD-10-CM

## 2020-06-08 RX ORDER — CARVEDILOL 6.25 MG/1
TABLET ORAL
Qty: 180 TAB | Refills: 1 | Status: SHIPPED | OUTPATIENT
Start: 2020-06-08 | End: 2020-12-15

## 2020-06-08 RX ORDER — FUROSEMIDE 20 MG/1
TABLET ORAL
Qty: 90 TAB | Refills: 1 | Status: SHIPPED | OUTPATIENT
Start: 2020-06-08 | End: 2020-12-15

## 2020-06-08 NOTE — TELEPHONE ENCOUNTER
Requested Prescriptions     Signed Prescriptions Disp Refills    furosemide (LASIX) 20 mg tablet 90 Tab 1     Sig: TAKE 1 TABLET BY MOUTH EVERY DAY     Authorizing Provider: Li Pope     Ordering User: RJ Jones    carvediloL (COREG) 6.25 mg tablet 180 Tab 1     Sig: TAKE 1 TABLET BY MOUTH TWICE A DAY     Authorizing Provider: Li Pope     Ordering User: Tata Chaparro     Verbal order per Dr. Delphine Lyons.     Future Appointments   Date Time Provider Nilda Lou   9/14/2020  8:20 AM MD Chicho Rollins

## 2020-06-12 ENCOUNTER — TELEPHONE (OUTPATIENT)
Dept: CARDIOLOGY CLINIC | Age: 55
End: 2020-06-12

## 2020-06-12 NOTE — TELEPHONE ENCOUNTER
Patient is calling to speak with Abran or Dr. Danette Garcia as her work has started to open back up and she would like to know if do to her pacemaker that she would be better off staying at home for now or if she would be okay to return to work. Please advise.     Phone: [phone]

## 2020-06-15 NOTE — TELEPHONE ENCOUNTER
Patient is requesting a return call in regards to whether or not she is considered high risk should stay home from work. Please advise.     Phone #: [phone]  Thanks

## 2020-06-15 NOTE — TELEPHONE ENCOUNTER
Identifiers x 2. 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 They are sitting every other desk. Working from home is an option. Wants to know if she is considered high risk. Per Dr. Monica Cervantes:    She is , given htn and h/o cmp     Identifiers x 2. Informed of the above and that our office could write letter if necessary. Verbalized understanding.

## 2020-06-17 ENCOUNTER — TELEPHONE (OUTPATIENT)
Dept: CARDIOLOGY CLINIC | Age: 55
End: 2020-06-17

## 2020-06-17 NOTE — LETTER
2020 Ms. Magi Merrill [address] [place] [id number]  [id number] To Whom it May Concern: 
 
Patient receives care from Dr. Faye Govea at [facility]. Due to her current health status and the risk for covid 19, he feels that she should continue to work from home. If you have any questions or concerns, feel free to contact our office at [phone].  
 
 
 
Sincerely, 
 
 
 
 
Rocio Keane MD

## 2020-06-17 NOTE — TELEPHONE ENCOUNTER
Patient returning Kita's call in regards to returning back to work.  Please advise      [facility]:[phone]

## 2020-06-18 ENCOUNTER — TELEPHONE (OUTPATIENT)
Dept: CARDIOLOGY CLINIC | Age: 55
End: 2020-06-18

## 2020-09-04 DIAGNOSIS — I42.9 CARDIOMYOPATHY (HCC): ICD-10-CM

## 2020-09-04 DIAGNOSIS — I10 ESSENTIAL HYPERTENSION, BENIGN: ICD-10-CM

## 2020-09-04 RX ORDER — LOSARTAN POTASSIUM 25 MG/1
TABLET ORAL
Qty: 180 TAB | Refills: 0 | Status: SHIPPED | OUTPATIENT
Start: 2020-09-04 | End: 2020-12-15

## 2020-09-04 RX ORDER — POTASSIUM CHLORIDE 750 MG/1
CAPSULE, EXTENDED RELEASE ORAL
Qty: 90 CAP | Refills: 0 | Status: SHIPPED | OUTPATIENT
Start: 2020-09-04 | End: 2020-12-15

## 2020-10-20 ENCOUNTER — TELEPHONE (OUTPATIENT)
Dept: CARDIOLOGY CLINIC | Age: 55
End: 2020-10-20

## 2020-10-21 ENCOUNTER — TELEPHONE (OUTPATIENT)
Dept: CARDIOLOGY CLINIC | Age: 55
End: 2020-10-21

## 2020-10-21 NOTE — TELEPHONE ENCOUNTER
Patient is requesting that the work note be placed in My Chart due to her not being able to come to the office to . Please advise.     Phone #: [phone]  Thanks

## 2020-11-05 ENCOUNTER — OFFICE VISIT (OUTPATIENT)
Dept: CARDIOLOGY CLINIC | Age: 55
End: 2020-11-05
Payer: COMMERCIAL

## 2020-11-05 VITALS
HEIGHT: 66 IN | OXYGEN SATURATION: 97 % | BODY MASS INDEX: 42.75 KG/M2 | WEIGHT: 266 LBS | HEART RATE: 60 BPM | SYSTOLIC BLOOD PRESSURE: 130 MMHG | RESPIRATION RATE: 16 BRPM | DIASTOLIC BLOOD PRESSURE: 80 MMHG

## 2020-11-05 DIAGNOSIS — I42.9 CARDIOMYOPATHY, UNSPECIFIED TYPE (HCC): Primary | ICD-10-CM

## 2020-11-05 PROCEDURE — 93000 ELECTROCARDIOGRAM COMPLETE: CPT | Performed by: SPECIALIST

## 2020-11-05 PROCEDURE — 99214 OFFICE O/P EST MOD 30 MIN: CPT | Performed by: SPECIALIST

## 2020-11-05 NOTE — PATIENT INSTRUCTIONS
Schedule follow up with Dr. Ludwin Acharya in 6 months. Schedule limited echocardiogram, 1 week prior to follow up.

## 2020-11-05 NOTE — PROGRESS NOTES
[address] [facility]                                                            OFFICE NOTE        Lynette Coto M.D.,ROGELIO Floyd Braga   1965  [id number]    Date/Time:  11/5/20209:54 AM        ICD-10-CM ICD-9-CM    1. Cardiomyopathy, unspecified type (HCC)  I42.9 425.4 AMB POC EKG ROUTINE W/ 12 LEADS, INTER & REP         SUBJECTIVE:  Doing well   no cp or sob some legs pain reported now resolved       Assessment/Plan  1.  Cardiomyopathy: She seems to be clinically compensated. Jois Castillo cardiomyopathy is nonischemic.  Likely in part related to her pacemaker.    Her latest  electrocardiogram reveals normal sinus rhythm and V paced.       Her last evaluation of ejection fraction was in 2/20  which revealed ejection fraction of 50-55%.  Continue Coreg, Lasix and losartan at this time but use lasix on a prn base (apparently it is worsening her meniere's symptoms)     CRT on hold for now     2.  Permanent pacemaker placement: Continue routine surveillance with Dr. Mandi Fernandez.  We need to determine the percentage of RV pacing in order to establish if CRT is an option.  I will of course defer to electrophysiologist.     3.  Hypertension: controlled  She lost 23 lbs     4.  Hyperlipidemia:  closely followed by her primary care physician.     5. Meniere: closely followed by ENT     See her back in 6 months  With limited echo prior                      HPI   63 y.o. female. previously followed by Dr. Lani Grossman. She has a history of hypertension and previous moderate left ventricular systolic dysfunction by echocardiography.      She underwent a left heart cardiac catheterization following an abnormal stress test. This apparently occurred at [facility] in 2006, and revealed no evidence of significant coronary obstructive disease.  Her ejection fraction eventually improved.      Date of previous stress test was 4/2007. This was a [place] Myoview stress test. No chest pain was reported. Indeterminate electrocardiographic changes. There were no evidences for gross ischemia. There was fixed defect at the level of the anterior wall for which tissue attenuation could be responsible. Ejection fraction was estimated at 52%.   echocardiogram on 3/2007. Normal left ventricular systolic function with an ejection fraction approximately of 50%, mild left ventricular hypertrophy, trivial aortic insufficiency and mild mitral regurgitation. Stress echo on 2010 no ischemia or MI and EF at rest 50%  Echo on 2/14:Systolic function was at the lower limits of normal.  Ejection fraction was estimated to be 45 % in the range of 40 % to 50 %. There was mild diffuse hypokinesis. Left atrium: The atrium was mildly dilated. Tricuspid valve: There was mild regurgitation. PPM for CHB on 2/14 with Dr. Nitish Johnson, unfortunately leads dislodge next day and patient was taken to [facility] after syncopal episode and second PPM placed    Echo on 11/15:Systolic function was mildly reduced by EF (biplane method  of disks). Ejection fraction was estimated to be 44 %. There was mild  diffuse hypokinesis. There was moderate hypokinesis of the mid-apical  anterior, mid-apical inferior, and apical lateral wall(s). Doppler  parameters were consistent with abnormal left ventricular relaxation  (grade 1 diastolic dysfunction). Left atrium: The atrium was mildly dilated. ECHO on 6/16:Left ventricle: Size was normal. Systolic function was by EF (biplane  method of disks). Ejection fraction was estimated to be 44 %. There was  moderate hypokinesis of the mid-apical anterior wall(s). Wall thickness  was normal.  COMPARISONS:  There has been no significant change. Comparison was made with the  previous study of 06-Nov-2015.   ECHO on 7/17:Systolic function was at the lower limits of normal.  Ejection fraction was estimated in the range of 50 % to 55 %. There were  no regional wall motion abnormalities. COMPARISONS:  Comparison was made with the previous study of 07-Jun-2016. LV overall  function has increased from 44 % to 53 %. Echo on 8/18:Systolic function was at the lower limits of normal.  Ejection fraction was estimated to be 50 %. There was hypokinesis of the  mid-apical inferior, apical septal, and apical lateral wall(s). Wall  thickness was mildly increased. Aortic valve: The valve was trileaflet. Leaflets exhibited sclerosis. Comparisons: LV overall function has not changed. RWMA now noted  Nuclear stress test on 10/18:Myocardial perfusion imaging is abnormal: there is a small area of infarction in the distal inferior wall. No gross ischemia noted. Also attenuation/pacemaker artifact likely present at the level of anterior wall  Overall left ventricular systolic function was normal. EF 54%      Cardiac  Catheterization on 10/30/18:LM: normal  LAD: normal  CX: codominant normal  RCA: codominant normal  LV EF 45-50%                 CARDIAC STUDIES        02/03/20   ECHO ADULT COMPLETE 02/04/2020 2/4/2020    Narrative · Mildly dilated left ventricle. Mild concentric hypertrophy. Low normal   systolic dysfunction. Estimated left ventricular ejection fraction is 50 -   55%. Abnormal left ventricular wall motion. Abnormal left ventricular   septal motion consistent with right ventricular pacing. Left ventricular   diastolic dysfunction. · Moderately dilated left atrium. · Pacing wire present  · Probably trileaflet aortic valve. · Trace mitral valve regurgitation is present. · Mild tricuspid valve regurgitation is present. · There is no evidence of pulmonary hypertension.         Signed by: Anuradha Barr MD                                 EKG Results     Procedure 720 Value Units Date/Time    AMB POC EKG ROUTINE W/ 12 LEADS, INTER & REP [[id number]] Resulted:  11/05/20 0929    Order Status:  Completed Updated:  11/05/20 9304 IMAGING      MRI Results (most recent):  No results found for this or any previous visit. CT Results (most recent):  No results found for this or any previous visit. XR Results (most recent):  Results from Hospital Encounter encounter on 10/24/18   XR CHEST PA LAT    Narrative EXAM:  XR CHEST PA LAT  INDICATION:  pre cath, history dilated cardiomyopathy, abnormal EKG, history of  pacemaker, chest pain unspecified, R07.9, R 93.1, E 66.01, E 78.5  TECHNIQUE:   PA and lateral chest x-rays were obtained. COMPARISON: Portable chest x-ray 2/11/14  FINDINGS:    Borderline heart size. Pulmonary vascularity is not congested. Cardiac pacer is again noted. Lungs are adequately expanded and there are no focal infiltrates or areas of  consolidation. Patient has chronic degenerative change and accentuated kyphosis at the  thoracolumbar junction. Impression IMPRESSION:  1. No acute cardiopulmonary abnormality demonstrated. 2. Suspect old spine injury thoracolumbar junction.              Past Medical History:   Diagnosis Date    Essential hypertension     Left ventricular dysfunction      Past Surgical History:   Procedure Laterality Date    CARDIAC SURG PROCEDURE UNLIST      cath 2005    HX ORTHOPAEDIC  1990    arthroscopic surgery right knee    INS PPM/ICD LED DUAL ONLY  2/12/2014          Social History     Tobacco Use    Smoking status: Never Smoker    Smokeless tobacco: Never Used   Substance Use Topics    Alcohol use: No    Drug use: Not on file     Family History   Problem Relation Age of Onset    Stroke Mother      No Known Allergies      Visit Vitals  /80 (BP 1 Location: Left arm, BP Patient Position: Sitting)   Pulse 60   Resp 16   Ht 5' 6\" (1.676 m)   Wt 266 lb (120.7 kg)   SpO2 97%   BMI 42.93 kg/m²         Last 3 Recorded Weights in this Encounter    11/05/20 0923   Weight: 266 lb (120.7 kg)            Review of Systems:   Pertinent items are noted in the History of Present Illness. Visit Vitals  /80 (BP 1 Location: Left arm, BP Patient Position: Sitting)   Pulse 60   Resp 16   Ht 5' 6\" (1.676 m)   Wt 266 lb (120.7 kg)   SpO2 97%   BMI 42.93 kg/m²     General Appearance:  Well developed, well nourished,alert and oriented x 3, and individual in no acute distress. Ears/Nose/Mouth/Throat:   Hearing grossly normal.         Neck: Supple. Chest:   Lungs clear to auscultation bilaterally. Cardiovascular:  Regular rate and rhythm, S1, S2 normal, no murmur. Abdomen:   Soft, non-tender, bowel sounds are active. Extremities: No edema bilaterally. Skin: Warm and dry. Current Outpatient Medications on File Prior to Visit   Medication Sig Dispense Refill    potassium chloride SA (MICRO-K) 10 mEq capsule TAKE 1 CAPSULE BY MOUTH EVERY DAY 90 Cap 0    losartan (COZAAR) 25 mg tablet TAKE 2 TABLETS BY MOUTH EVERY DAY*DOSE INCREASE* 180 Tab 0    furosemide (LASIX) 20 mg tablet TAKE 1 TABLET BY MOUTH EVERY DAY 90 Tab 1    carvediloL (COREG) 6.25 mg tablet TAKE 1 TABLET BY MOUTH TWICE A  Tab 1    meclizine (ANTIVERT) 25 mg tablet Take 25 mg by mouth three (3) times daily as needed for Dizziness. 1    betahistine HCl (BETAHISTINE, BULK,) Take 8 mg by mouth two (2) times a day.  ibuprofen (MOTRIN) 200 mg tablet Take 200 mg by mouth every six (6) hours as needed for Pain.  fluticasone (FLONASE ALLERGY RELIEF) 50 mcg/actuation nasal spray 2 Sprays by Both Nostrils route as needed.  PYRIDOXINE HCL, VITAMIN B6, (VITAMIN B-6 PO) Take  by mouth daily.  aspirin 81 mg chewable tablet Take 81 mg by mouth daily. No current facility-administered medications on file prior to visit. Amy Loving had no medications administered during this visit.      Current Outpatient Medications   Medication Sig    potassium chloride SA (MICRO-K) 10 mEq capsule TAKE 1 CAPSULE BY MOUTH EVERY DAY    losartan (COZAAR) 25 mg tablet TAKE 2 TABLETS BY MOUTH EVERY DAY*DOSE INCREASE*    furosemide (LASIX) 20 mg tablet TAKE 1 TABLET BY MOUTH EVERY DAY    carvediloL (COREG) 6.25 mg tablet TAKE 1 TABLET BY MOUTH TWICE A DAY    meclizine (ANTIVERT) 25 mg tablet Take 25 mg by mouth three (3) times daily as needed for Dizziness.  betahistine HCl (BETAHISTINE, BULK,) Take 8 mg by mouth two (2) times a day.  ibuprofen (MOTRIN) 200 mg tablet Take 200 mg by mouth every six (6) hours as needed for Pain.  fluticasone (FLONASE ALLERGY RELIEF) 50 mcg/actuation nasal spray 2 Sprays by Both Nostrils route as needed.  PYRIDOXINE HCL, VITAMIN B6, (VITAMIN B-6 PO) Take  by mouth daily.  aspirin 81 mg chewable tablet Take 81 mg by mouth daily. No current facility-administered medications for this visit. Lab Results   Component Value Date/Time    Cholesterol, total 207 (H) 09/13/2016 09:50 AM    HDL Cholesterol 51 09/13/2016 09:50 AM    LDL, calculated 139 (H) 09/13/2016 09:50 AM    VLDL, calculated 17 09/13/2016 09:50 AM    Triglyceride 86 09/13/2016 09:50 AM    CHOL/HDL Ratio 3.4 02/11/2014 11:44 AM       Lab Results   Component Value Date/Time    Sodium 140 11/26/2018 03:33 PM    Potassium 3.8 11/26/2018 03:33 PM    Chloride 101 11/26/2018 03:33 PM    CO2 25 11/26/2018 03:33 PM    Anion gap 8 02/12/2014 04:10 AM    Glucose 134 (H) 11/26/2018 03:33 PM    BUN 18 11/26/2018 03:33 PM    Creatinine 0.97 11/26/2018 03:33 PM    BUN/Creatinine ratio 19 11/26/2018 03:33 PM    GFR est AA 77 11/26/2018 03:33 PM    GFR est non-AA 66 11/26/2018 03:33 PM    Calcium 9.2 11/26/2018 03:33 PM       Lab Results   Component Value Date/Time    ALT (SGPT) 17 09/13/2016 09:50 AM    Alk.  phosphatase 101 09/13/2016 09:50 AM    Bilirubin, direct 0.1 10/08/2010 11:30 AM    Bilirubin, total 0.2 09/13/2016 09:50 AM       Lab Results   Component Value Date/Time    WBC 7.6 10/23/2018 04:18 PM    HGB 13.5 10/23/2018 04:18 PM    HCT 40.1 10/23/2018 04:18 PM    PLATELET 425 10/23/2018 04:18 PM    MCV 90 10/23/2018 04:18 PM       Lab Results   Component Value Date/Time    TSH 1.070 09/13/2016 09:50 AM         Lab Results   Component Value Date/Time    Cholesterol, total 207 (H) 09/13/2016 09:50 AM    Cholesterol, total 174 11/21/2015 10:12 AM    Cholesterol, total 175 02/11/2014 11:44 AM    Cholesterol, total 185 10/08/2010 11:30 AM    HDL Cholesterol 51 09/13/2016 09:50 AM    HDL Cholesterol 43 11/21/2015 10:12 AM    HDL Cholesterol 52 02/11/2014 11:44 AM    HDL Cholesterol 47 10/08/2010 11:30 AM    LDL, calculated 139 (H) 09/13/2016 09:50 AM    LDL, calculated 116 (H) 11/21/2015 10:12 AM    LDL, calculated 106.6 (H) 02/11/2014 11:44 AM    LDL, calculated 121.6 (H) 10/08/2010 11:30 AM    Triglyceride 86 09/13/2016 09:50 AM    Triglyceride 77 11/21/2015 10:12 AM    Triglyceride 82 02/11/2014 11:44 AM    Triglyceride 82 10/08/2010 11:30 AM    CHOL/HDL Ratio 3.4 02/11/2014 11:44 AM    CHOL/HDL Ratio 3.9 10/08/2010 11:30 AM                Please note that this dictation was completed with [employer], the [employer] voice recognition software. Quite often unanticipated grammatical, syntax, homophones, and other interpretative errors are inadvertently transcribed by the computer software. Please disregard these errors. Please excuse any errors that have escaped final proofreading.

## 2020-12-10 DIAGNOSIS — I42.9 CARDIOMYOPATHY (HCC): ICD-10-CM

## 2020-12-10 DIAGNOSIS — I10 ESSENTIAL HYPERTENSION, BENIGN: ICD-10-CM

## 2020-12-15 RX ORDER — POTASSIUM CHLORIDE 750 MG/1
CAPSULE, EXTENDED RELEASE ORAL
Qty: 90 CAP | Refills: 1 | Status: SHIPPED | OUTPATIENT
Start: 2020-12-15 | End: 2021-06-09

## 2020-12-15 RX ORDER — LOSARTAN POTASSIUM 50 MG/1
50 TABLET ORAL DAILY
Qty: 90 TAB | Refills: 1 | Status: SHIPPED | OUTPATIENT
Start: 2020-12-15 | End: 2021-06-09

## 2020-12-15 RX ORDER — FUROSEMIDE 20 MG/1
20 TABLET ORAL AS NEEDED
Qty: 90 TAB | Refills: 1 | Status: SHIPPED | OUTPATIENT
Start: 2020-12-15 | End: 2021-06-09

## 2020-12-15 RX ORDER — CARVEDILOL 6.25 MG/1
6.25 TABLET ORAL 2 TIMES DAILY
Qty: 180 TAB | Refills: 1 | Status: SHIPPED | OUTPATIENT
Start: 2020-12-15 | End: 2021-06-09

## 2020-12-15 NOTE — TELEPHONE ENCOUNTER
Cardiologist: Dr. Leonie Phillips    Last appt: 3/17/2020  Future Appointments   Date Time Provider Nilda Dasha   4/28/2021  8:00 AM STANISLAW العلي BS AMB   5/6/2021  8:20 AM Alejandro Velasco MD CAVREY BS AMB       Requested Prescriptions     Signed Prescriptions Disp Refills    potassium chloride SA (MICRO-K) 10 mEq capsule 90 Cap 1     Sig: TAKE 1 CAPSULE BY MOUTH EVERY DAY     Authorizing Provider: Timo Huynh     Ordering User: TERRY NORTON    carvediloL (COREG) 6.25 mg tablet 180 Tab 1     Sig: Take 1 Tab by mouth two (2) times a day. Authorizing Provider: Timo Huynh     Ordering User: TERRY NORTON    losartan (COZAAR) 50 mg tablet 90 Tab 1     Sig: Take 1 Tab by mouth daily. Authorizing Provider: Timo Huynh     Ordering User: TERRY NORTON    furosemide (LASIX) 20 mg tablet 90 Tab 1     Sig: Take 1 Tab by mouth as needed (Swelling). Authorizing Provider: Timo Huynh     Ordering User: TERRY NORTON         Refills VO per Dr. Leonie Phillips.

## 2021-04-12 ENCOUNTER — PATIENT MESSAGE (OUTPATIENT)
Dept: CARDIOLOGY CLINIC | Age: 56
End: 2021-04-12

## 2021-04-13 NOTE — TELEPHONE ENCOUNTER
From: Pamela Agustin  To: Ramila Espitia MD  Sent: 4/12/2021 3:04 PM EDT  Subject: Non-Urgent Medical Question    Good Afternoon - I understand that there are COVID vaccinations many friends and family have gotten their shot. I have hesitated due to my underlying medical condition and would prefer to get the advice or recommendation from my DrWill before receiving one. I would also prefer to receive a vaccination from Dr. Jayjay Beltran office vs a clinic.  Please advise what your advice would be - Thanks

## 2021-04-13 NOTE — TELEPHONE ENCOUNTER
Felisa Chavira MD  You 17 hours ago (4:04 PM)     Proceed with covid vaccine   Let  her know we do not do vaccination in this office but if she is agreeable help her to schedule with [facility]

## 2021-04-28 ENCOUNTER — ANCILLARY PROCEDURE (OUTPATIENT)
Dept: CARDIOLOGY CLINIC | Age: 56
End: 2021-04-28
Payer: COMMERCIAL

## 2021-04-28 VITALS — BODY MASS INDEX: 43.39 KG/M2 | HEIGHT: 66 IN | WEIGHT: 270 LBS

## 2021-04-28 PROCEDURE — 93321 DOPPLER ECHO F-UP/LMTD STD: CPT | Performed by: SPECIALIST

## 2021-04-28 PROCEDURE — 93325 DOPPLER ECHO COLOR FLOW MAPG: CPT | Performed by: SPECIALIST

## 2021-04-28 PROCEDURE — 93308 TTE F-UP OR LMTD: CPT | Performed by: SPECIALIST

## 2021-04-30 LAB
ECHO AO ASC DIAM: 3 CM
ECHO AO ROOT DIAM: 2.97 CM
ECHO AV AREA PEAK VELOCITY: 2.21 CM2
ECHO AV AREA VTI: 2.07 CM2
ECHO AV AREA/BSA PEAK VELOCITY: 1 CM2/M2
ECHO AV AREA/BSA VTI: 0.9 CM2/M2
ECHO AV MEAN GRADIENT: 5.12 MMHG
ECHO AV PEAK GRADIENT: 8.14 MMHG
ECHO AV PEAK VELOCITY: 142.66 CM/S
ECHO AV VTI: 32.59 CM
ECHO LA AREA 4C: 21.79 CM2
ECHO LA MAJOR AXIS: 4.57 CM
ECHO LA MINOR AXIS: 2.01 CM
ECHO LA VOL 2C: 88 ML (ref 22–52)
ECHO LA VOL 4C: 60.47 ML (ref 22–52)
ECHO LA VOL BP: 80.64 ML (ref 22–52)
ECHO LA VOL/BSA BIPLANE: 35.49 ML/M2 (ref 16–28)
ECHO LA VOLUME INDEX A2C: 38.73 ML/M2 (ref 16–28)
ECHO LA VOLUME INDEX A4C: 26.61 ML/M2 (ref 16–28)
ECHO LV E' LATERAL VELOCITY: 9.95 CM/S
ECHO LV E' SEPTAL VELOCITY: 9.73 CM/S
ECHO LV EDV A2C: 69.36 ML
ECHO LV EDV A4C: 104.4 ML
ECHO LV EDV BP: 88.56 ML (ref 56–104)
ECHO LV EDV INDEX A4C: 45.9 ML/M2
ECHO LV EDV INDEX BP: 39 ML/M2
ECHO LV EDV NDEX A2C: 30.5 ML/M2
ECHO LV EJECTION FRACTION A2C: 58 PERCENT
ECHO LV EJECTION FRACTION A4C: 46 PERCENT
ECHO LV EJECTION FRACTION BIPLANE: 51.3 PERCENT (ref 55–100)
ECHO LV ESV A2C: 28.94 ML
ECHO LV ESV A4C: 56.31 ML
ECHO LV ESV BP: 43.11 ML (ref 19–49)
ECHO LV ESV INDEX A2C: 12.7 ML/M2
ECHO LV ESV INDEX A4C: 24.8 ML/M2
ECHO LV ESV INDEX BP: 19 ML/M2
ECHO LV INTERNAL DIMENSION DIASTOLIC: 5.27 CM (ref 3.9–5.3)
ECHO LV INTERNAL DIMENSION SYSTOLIC: 4.36 CM
ECHO LV IVSD: 1.11 CM (ref 0.6–0.9)
ECHO LV MASS 2D: 241.2 G (ref 67–162)
ECHO LV MASS INDEX 2D: 106.1 G/M2 (ref 43–95)
ECHO LV POSTERIOR WALL DIASTOLIC: 1.2 CM (ref 0.6–0.9)
ECHO LVOT DIAM: 1.98 CM
ECHO LVOT PEAK GRADIENT: 4.2 MMHG
ECHO LVOT PEAK VELOCITY: 102.51 CM/S
ECHO LVOT SV: 67.4 ML
ECHO LVOT VTI: 21.91 CM
ECHO MV A VELOCITY: 77.11 CM/S
ECHO MV E DECELERATION TIME (DT): 221.76 MS
ECHO MV E VELOCITY: 85.18 CM/S
ECHO MV E/A RATIO: 1.1
ECHO MV E/E' LATERAL: 8.56
ECHO MV E/E' RATIO (AVERAGED): 8.66
ECHO MV E/E' SEPTAL: 8.75
ECHO PV MAX VELOCITY: 110.27 CM/S
ECHO PV PEAK INSTANTANEOUS GRADIENT SYSTOLIC: 4.86 MMHG
ECHO RA MINOR AXIS: 3.27 CM
ECHO RV INTERNAL DIMENSION: 3.06 CM
ECHO TV REGURGITANT MAX VELOCITY: 244.26 CM/S
ECHO TV REGURGITANT PEAK GRADIENT: 23.87 MMHG
LA VOL DISK BP: 75.34 ML (ref 22–52)

## 2021-05-06 ENCOUNTER — OFFICE VISIT (OUTPATIENT)
Dept: CARDIOLOGY CLINIC | Age: 56
End: 2021-05-06
Payer: COMMERCIAL

## 2021-05-06 VITALS
HEART RATE: 79 BPM | SYSTOLIC BLOOD PRESSURE: 140 MMHG | OXYGEN SATURATION: 98 % | WEIGHT: 271 LBS | DIASTOLIC BLOOD PRESSURE: 90 MMHG | HEIGHT: 66 IN | BODY MASS INDEX: 43.55 KG/M2

## 2021-05-06 DIAGNOSIS — I42.0 DILATED CARDIOMYOPATHY (HCC): Primary | ICD-10-CM

## 2021-05-06 PROCEDURE — 99214 OFFICE O/P EST MOD 30 MIN: CPT | Performed by: SPECIALIST

## 2021-05-06 PROCEDURE — 93000 ELECTROCARDIOGRAM COMPLETE: CPT | Performed by: SPECIALIST

## 2021-05-06 NOTE — PROGRESS NOTES
Visit Vitals  BP (!) 140/90 (BP 1 Location: Left arm, BP Patient Position: Sitting, BP Cuff Size: Adult)   Pulse 79   Ht 5' 6\" (1.676 m)   Wt 271 lb (122.9 kg)   SpO2 98%   BMI 43.74 kg/m²
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
## 2021-05-26 ENCOUNTER — PATIENT MESSAGE (OUTPATIENT)
Dept: CARDIOLOGY CLINIC | Age: 56
End: 2021-05-26

## 2021-06-01 NOTE — TELEPHONE ENCOUNTER
Destiney Padron MD  You 4 days ago   MG  As much as I would like to help her I cannot find a current CARDIAC indication to keep her at home   [facility] ask her to follow up also with her pcp for this    Message text    Flakito Sanz MD 6 days ago   ES  Please advise

## 2021-06-09 DIAGNOSIS — I10 ESSENTIAL HYPERTENSION, BENIGN: ICD-10-CM

## 2021-06-09 DIAGNOSIS — I42.9 CARDIOMYOPATHY (HCC): ICD-10-CM

## 2021-06-09 RX ORDER — FUROSEMIDE 20 MG/1
20 TABLET ORAL AS NEEDED
Qty: 90 TABLET | Refills: 1 | Status: SHIPPED | OUTPATIENT
Start: 2021-06-09 | End: 2021-12-10

## 2021-06-09 RX ORDER — LOSARTAN POTASSIUM 50 MG/1
TABLET ORAL
Qty: 90 TABLET | Refills: 1 | Status: SHIPPED | OUTPATIENT
Start: 2021-06-09 | End: 2021-12-10

## 2021-06-09 RX ORDER — CARVEDILOL 6.25 MG/1
TABLET ORAL
Qty: 180 TABLET | Refills: 1 | Status: SHIPPED | OUTPATIENT
Start: 2021-06-09 | End: 2021-12-10

## 2021-06-09 RX ORDER — POTASSIUM CHLORIDE 750 MG/1
CAPSULE, EXTENDED RELEASE ORAL
Qty: 90 CAPSULE | Refills: 1 | Status: SHIPPED | OUTPATIENT
Start: 2021-06-09 | End: 2021-12-10

## 2021-06-09 NOTE — TELEPHONE ENCOUNTER
Requested Prescriptions     Signed Prescriptions Disp Refills    carvediloL (COREG) 6.25 mg tablet 180 Tablet 1     Sig: TAKE 1 TABLET BY MOUTH TWICE A DAY     Authorizing Provider: Smiley Brand     Ordering User: RJ Adam    losartan (COZAAR) 50 mg tablet 90 Tablet 1     Sig: TAKE 1 TABLET BY MOUTH EVERY DAY     Authorizing Provider: Smiley Brand     Ordering User: RJ Adam    potassium chloride SA (MICRO-K) 10 mEq capsule 90 Capsule 1     Sig: TAKE 1 CAPSULE BY MOUTH EVERY DAY     Authorizing Provider: Smiley Brand     Ordering User: Jagdeep THAO    furosemide (LASIX) 20 mg tablet 90 Tablet 1     Sig: TAKE 1 TAB BY MOUTH AS NEEDED (SWELLING). Authorizing Provider: Smiley Brand     Ordering User: Deya Kelsey     Verbal order per Dr. Franci Huntley.     Future Appointments   Date Time Provider Nilda Lou   11/11/2021  8:40 AM MD YUN Germain AMB

## 2021-12-09 DIAGNOSIS — I42.9 CARDIOMYOPATHY (HCC): ICD-10-CM

## 2021-12-09 DIAGNOSIS — I10 ESSENTIAL HYPERTENSION, BENIGN: ICD-10-CM

## 2021-12-10 RX ORDER — CARVEDILOL 6.25 MG/1
TABLET ORAL
Qty: 180 TABLET | Refills: 1 | Status: SHIPPED | OUTPATIENT
Start: 2021-12-10 | End: 2022-05-31

## 2021-12-10 RX ORDER — FUROSEMIDE 20 MG/1
20 TABLET ORAL AS NEEDED
Qty: 90 TABLET | Refills: 1 | Status: SHIPPED | OUTPATIENT
Start: 2021-12-10 | End: 2022-05-31

## 2021-12-10 RX ORDER — POTASSIUM CHLORIDE 750 MG/1
10 CAPSULE, EXTENDED RELEASE ORAL AS NEEDED
Qty: 90 CAPSULE | Refills: 1 | Status: SHIPPED | OUTPATIENT
Start: 2021-12-10 | End: 2022-05-31

## 2021-12-10 RX ORDER — LOSARTAN POTASSIUM 50 MG/1
TABLET ORAL
Qty: 90 TABLET | Refills: 1 | Status: SHIPPED | OUTPATIENT
Start: 2021-12-10 | End: 2022-05-31

## 2021-12-10 NOTE — TELEPHONE ENCOUNTER
Cardiologist: Dr. Radha Allen    Last appt: 3/17/2020  Future Appointments   Date Time Provider Nilda Lou   1/10/2022  9:40 AM MD YUN Bullock BS AMB       Requested Prescriptions     Signed Prescriptions Disp Refills    furosemide (LASIX) 20 mg tablet 90 Tablet 1     Sig: TAKE 1 TAB BY MOUTH AS NEEDED (SWELLING). Authorizing Provider: Carolina Dan     Ordering User: TERRY Nagel    potassium chloride SA (MICRO-K) 10 mEq capsule 90 Capsule 1     Sig: Take 1 Capsule by mouth as needed (When lasix is taken for swelling).      Authorizing Provider: Carolina Dan     Ordering User: TERRY NORTON    losartan (COZAAR) 50 mg tablet 90 Tablet 1     Sig: TAKE 1 TABLET BY MOUTH EVERY DAY     Authorizing Provider: Carolina Dan     Ordering User: TERRY NORTON    carvediloL (COREG) 6.25 mg tablet 180 Tablet 1     Sig: TAKE 1 TABLET BY MOUTH TWICE A DAY     Authorizing Provider: Carolina Dan     Ordering User: TERRY NORTON         Refills VO per Dr. Yasmine Loyd.

## 2021-12-13 ENCOUNTER — TRANSCRIBE ORDER (OUTPATIENT)
Dept: SCHEDULING | Age: 56
End: 2021-12-13

## 2021-12-13 DIAGNOSIS — H93.11 TINNITUS OF RIGHT EAR: ICD-10-CM

## 2021-12-13 DIAGNOSIS — H91.21 SUDDEN IDIOPATHIC HEARING LOSS, RIGHT EAR: ICD-10-CM

## 2021-12-13 DIAGNOSIS — H81.01 MENIERE'S DISEASE OF RIGHT EAR: Primary | ICD-10-CM

## 2022-03-19 PROBLEM — E66.01 OBESITY, MORBID (HCC): Status: ACTIVE | Noted: 2018-02-02

## 2022-05-30 DIAGNOSIS — I42.9 CARDIOMYOPATHY (HCC): ICD-10-CM

## 2022-05-30 DIAGNOSIS — I10 ESSENTIAL HYPERTENSION, BENIGN: ICD-10-CM

## 2022-05-31 RX ORDER — LOSARTAN POTASSIUM 50 MG/1
TABLET ORAL
Qty: 90 TABLET | Refills: 0 | Status: SHIPPED | OUTPATIENT
Start: 2022-05-31 | End: 2022-08-31

## 2022-05-31 RX ORDER — CARVEDILOL 6.25 MG/1
TABLET ORAL
Qty: 180 TABLET | Refills: 0 | Status: SHIPPED | OUTPATIENT
Start: 2022-05-31 | End: 2022-08-31

## 2022-05-31 RX ORDER — POTASSIUM CHLORIDE 750 MG/1
10 CAPSULE, EXTENDED RELEASE ORAL AS NEEDED
Qty: 90 CAPSULE | Refills: 0 | Status: SHIPPED | OUTPATIENT
Start: 2022-05-31 | End: 2022-08-31

## 2022-05-31 RX ORDER — FUROSEMIDE 20 MG/1
20 TABLET ORAL AS NEEDED
Qty: 90 TABLET | Refills: 0 | Status: SHIPPED | OUTPATIENT
Start: 2022-05-31 | End: 2022-08-31

## 2022-05-31 NOTE — TELEPHONE ENCOUNTER
Cardiologist: Dr. Anni Howe    Last appt: Visit date not found  Future Appointments   Date Time Provider Nilda Lou   8/10/2022  8:20 AM MD YUN Santiago BS AMB       Requested Prescriptions     Signed Prescriptions Disp Refills    losartan (COZAAR) 50 mg tablet 90 Tablet 0     Sig: TAKE 1 TABLET BY MOUTH EVERY DAY     Authorizing Provider: Francis Dodson     Ordering User: TERRY NORTON    carvediloL (COREG) 6.25 mg tablet 180 Tablet 0     Sig: TAKE 1 TABLET BY MOUTH TWICE A DAY     Authorizing Provider: Francis Dodson     Ordering User: TERRY NORTON    potassium chloride SA (MICRO-K) 10 mEq capsule 90 Capsule 0     Sig: TAKE 1 CAPSULE BY MOUTH AS NEEDED (WHEN LASIX IS TAKEN FOR SWELLING). Authorizing Provider: Francis Dodson     Ordering User: TERRY NORTON    furosemide (LASIX) 20 mg tablet 90 Tablet 0     Sig: TAKE 1 TAB BY MOUTH AS NEEDED (SWELLING).      Authorizing Provider: Francis Mae User: TERRY NORTON         Refills VO per Dr. Mary Alicea.

## 2022-08-10 ENCOUNTER — OFFICE VISIT (OUTPATIENT)
Dept: CARDIOLOGY CLINIC | Age: 57
End: 2022-08-10
Payer: COMMERCIAL

## 2022-08-10 VITALS
DIASTOLIC BLOOD PRESSURE: 80 MMHG | WEIGHT: 280 LBS | HEART RATE: 72 BPM | SYSTOLIC BLOOD PRESSURE: 120 MMHG | OXYGEN SATURATION: 97 % | HEIGHT: 66 IN | RESPIRATION RATE: 16 BRPM | BODY MASS INDEX: 45 KG/M2

## 2022-08-10 DIAGNOSIS — I42.9 CARDIOMYOPATHY, UNSPECIFIED TYPE (HCC): Primary | ICD-10-CM

## 2022-08-10 PROCEDURE — 93000 ELECTROCARDIOGRAM COMPLETE: CPT | Performed by: SPECIALIST

## 2022-08-10 PROCEDURE — 99214 OFFICE O/P EST MOD 30 MIN: CPT | Performed by: SPECIALIST

## 2022-08-10 NOTE — PATIENT INSTRUCTIONS
Please have an echocardiogram at the next available appointment (we will call result)    Follow up with Dr. William Pappas in 6 months

## 2022-08-10 NOTE — PROGRESS NOTES
[address] [facility]                                                            OFFICE NOTE        Tito Harris M.D.,ROGELIO Igor Running   1965  [id number]    Date/Time:  8/10/50046:43 AM        Wt Readings from Last 3 Encounters:   08/10/22 280 lb (127 kg)   05/06/21 271 lb (122.9 kg)   04/28/21 270 lb (122.5 kg)         SUBJECTIVE:  She seems to be doing well. Relatively sedentary but denies any particular shortness of breath or chest discomfort or palpitations. Assessment/Plan    1. Cardiomyopathy: She does have a nonischemic cardiomyopathy. She is clinically compensated. Her electrocardiogram today reveals a normal sinus rhythm and V-paced. Last echocardiogram of April 2021 revealed ejection fraction of 50 to 55%. Continue same dose of Coreg and losartan. Continue to use Lasix and potassium on a as needed basis. CRT evaluation on hold for now. Obtain follow-up echocardiogram.    2.  Permanent pacemaker: Continue routine surveillance with Dr. Loreta Mcelroy. CRT on hold for now depending also on results of the echocardiogram.    3.  Hypertension: Well-controlled continue same medication. 4.  Hyperlipidemia: Closely followed by primary care physician. 5.  Ménière disease: Stable closely followed by ENT. Tobias Lane Discussed nutrition and exercise. Otherwise if her echocardiogram is unchanged I will see her back in 6 months or of course sooner if any question or problems arise prior to the        HPI   61 y.o. female. previously followed by Dr. Hemant Yuan. She has a history of hypertension and previous moderate left ventricular systolic dysfunction by echocardiography. She underwent a left heart cardiac catheterization following an abnormal stress test. This apparently occurred at [facility] in 2006, and revealed no evidence of significant coronary obstructive disease. Her ejection fraction eventually improved. Date of previous stress test was 4/2007. This was a Persantine Myoview stress test. No chest pain was reported. Indeterminate electrocardiographic changes. There were no evidences for gross ischemia. There was fixed defect at the level of the anterior wall for which tissue attenuation could be responsible. Ejection fraction was estimated at 52%. echocardiogram on 3/2007. Normal left ventricular systolic function with an ejection fraction approximately of 50%, mild left ventricular hypertrophy, trivial aortic insufficiency and mild mitral regurgitation. Stress echo on 2010 no ischemia or MI and EF at rest 50%  Echo on 2/14:Systolic function was at the lower limits of normal.  Ejection fraction was estimated to be 45 % in the range of 40 % to 50 %. There was mild diffuse hypokinesis. Left atrium: The atrium was mildly dilated. Tricuspid valve: There was mild regurgitation. PPM for CHB on 2/14 with Dr. Edyta He, unfortunately leads dislodge next day and patient was taken to [facility] after syncopal episode and second PPM placed     Echo on 11/15:Systolic function was mildly reduced by EF (biplane method  of disks). Ejection fraction was estimated to be 44 %. There was mild  diffuse hypokinesis. There was moderate hypokinesis of the mid-apical  anterior, mid-apical inferior, and apical lateral wall(s). Doppler  parameters were consistent with abnormal left ventricular relaxation  (grade 1 diastolic dysfunction). Left atrium: The atrium was mildly dilated. ECHO on 6/16:Left ventricle: Size was normal. Systolic function was by EF (biplane  method of disks). Ejection fraction was estimated to be 44 %. There was  moderate hypokinesis of the mid-apical anterior wall(s). Wall thickness  was normal.  COMPARISONS:  There has been no significant change. Comparison was made with the  previous study of 06-Nov-2015.   ECHO on 7/17:Systolic function was at the lower limits of normal.  Ejection fraction was estimated in the range of 50 % to 55 %. There were  no regional wall motion abnormalities. COMPARISONS:  Comparison was made with the previous study of 07-Jun-2016. LV overall  function has increased from 44 % to 53 %. Echo on 8/18:Systolic function was at the lower limits of normal.  Ejection fraction was estimated to be 50 %. There was hypokinesis of the  mid-apical inferior, apical septal, and apical lateral wall(s). Wall  thickness was mildly increased. Aortic valve: The valve was trileaflet. Leaflets exhibited sclerosis. Comparisons: LV overall function has not changed. RWMA now noted  Nuclear stress test on 10/18:Myocardial perfusion imaging is abnormal: there is a small area of infarction in the distal inferior wall. No gross ischemia noted. Also attenuation/pacemaker artifact likely present at the level of anterior wall  Overall left ventricular systolic function was normal. EF 54%        Cardiac  Catheterization on 10/30/18:LM: normal  LAD: normal  CX: codominant normal  RCA: codominant normal  LV EF 45-50%                 CARDIAC STUDIES        11/05/20    ECHO ADULT FOLLOW-UP OR LIMITED 04/30/2021 4/30/2021    Interpretation Summary  · LV: Estimated LVEF is 50 - 55% and Biplane 53%. Biplane method used to measure ejection fraction. Normal cavity size. Mild concentric hypertrophy. Low normal systolic function. Abnormal left ventricular septal motion consistent with right ventricular pacing. Left ventricular diastolic dysfunction. · LA: Mildly dilated left atrium. Left Atrium volume index is 35 mL/m2. · RV: Pacing wire present  · TV: Mild tricuspid valve regurgitation is present. · MV: Mild mitral valve regurgitation is present.     Signed by: Tobi Sutherland MD on 4/30/2021  7:50 AM                              EKG Results       Procedure 720 Value Units Date/Time    AMB POC EKG ROUTINE W/ 12 LEADS, INTER & REP [[id number]] Resulted: 08/10/22 6670 Order Status: Completed Updated: 08/10/22 0827                IMAGING      MRI Results (most recent):  No results found for this or any previous visit. CT Results (most recent):  No results found for this or any previous visit. XR Results (most recent):  Results from Hospital Encounter encounter on 10/24/18    XR CHEST PA LAT    Narrative  EXAM:  XR CHEST PA LAT  INDICATION:  pre cath, history dilated cardiomyopathy, abnormal EKG, history of  pacemaker, chest pain unspecified, R07.9, R 93.1, E 66.01, E 78.5  TECHNIQUE:  PA and lateral chest x-rays were obtained. COMPARISON: Portable chest x-ray 2/11/14  FINDINGS:  Borderline heart size. Pulmonary vascularity is not congested. Cardiac pacer is again noted. Lungs are adequately expanded and there are no focal infiltrates or areas of  consolidation. Patient has chronic degenerative change and accentuated kyphosis at the  thoracolumbar junction. Impression  IMPRESSION:  1. No acute cardiopulmonary abnormality demonstrated. 2. Suspect old spine injury thoracolumbar junction. Past Medical History:   Diagnosis Date    Essential hypertension     Left ventricular dysfunction      Past Surgical History:   Procedure Laterality Date    HX ORTHOPAEDIC  1990    arthroscopic surgery right knee    INS PPM/ICD LED DUAL ONLY  2/12/2014         [facility] CARDIAC SURG PROCEDURE UNLIST      cath 2005     Social History     Tobacco Use    Smoking status: Never    Smokeless tobacco: Never   Substance Use Topics    Alcohol use: No     Family History   Problem Relation Age of Onset    Stroke Mother      No Known Allergies      Visit Vitals  /80   Pulse 72   Resp 16   Ht 5' 6\" (1.676 m)   Wt 280 lb (127 kg)   SpO2 97%   BMI 45.19 kg/m²         Last 3 Recorded Weights in this Encounter    08/10/22 0822   Weight: 280 lb (127 kg)            Review of Systems:   Pertinent items are noted in the History of Present Illness.        Visit Vitals  /80   Pulse 72   Resp 16   Ht 5' 6\" (1.676 m)   Wt 280 lb (127 kg)   SpO2 97%   BMI 45.19 kg/m²     General Appearance:  Well developed, well nourished,alert and oriented x 3, and individual in no acute distress. Ears/Nose/Mouth/Throat:   Hearing grossly normal.         Neck: Supple. Chest:   Lungs clear to auscultation bilaterally. Cardiovascular:  Regular rate and rhythm, S1, S2 normal, no murmur. Abdomen:   Soft, non-tender, bowel sounds are active. Extremities: No edema bilaterally. Skin: Warm and dry. Current Outpatient Medications on File Prior to Visit   Medication Sig Dispense Refill    losartan (COZAAR) 50 mg tablet TAKE 1 TABLET BY MOUTH EVERY DAY 90 Tablet 0    carvediloL (COREG) 6.25 mg tablet TAKE 1 TABLET BY MOUTH TWICE A  Tablet 0    potassium chloride SA (MICRO-K) 10 mEq capsule TAKE 1 CAPSULE BY MOUTH AS NEEDED (WHEN LASIX IS TAKEN FOR SWELLING). 90 Capsule 0    furosemide (LASIX) 20 mg tablet TAKE 1 TAB BY MOUTH AS NEEDED (SWELLING). 90 Tablet 0    meclizine (ANTIVERT) 25 mg tablet Take 25 mg by mouth three (3) times daily as needed for Dizziness. 1    betahistine HCl (BETAHISTINE, BULK,) Take 8 mg by mouth two (2) times a day. ibuprofen (MOTRIN) 200 mg tablet Take 200 mg by mouth every six (6) hours as needed for Pain. fluticasone propionate (FLONASE) 50 mcg/actuation nasal spray 2 Sprays by Both Nostrils route as needed. aspirin 81 mg chewable tablet Take 81 mg by mouth daily. PYRIDOXINE HCL, VITAMIN B6, (VITAMIN B-6 PO) Take  by mouth daily. (Patient not taking: Reported on 8/10/2022)       No current facility-administered medications on file prior to visit. Buster Morales. [facility] had no medications administered during this visit.      Current Outpatient Medications   Medication Sig    losartan (COZAAR) 50 mg tablet TAKE 1 TABLET BY MOUTH EVERY DAY    carvediloL (COREG) 6.25 mg tablet TAKE 1 TABLET BY MOUTH TWICE A DAY    potassium chloride SA (MICRO-K) 10 mEq capsule TAKE 1 CAPSULE BY MOUTH AS NEEDED (WHEN LASIX IS TAKEN FOR SWELLING). furosemide (LASIX) 20 mg tablet TAKE 1 TAB BY MOUTH AS NEEDED (SWELLING). meclizine (ANTIVERT) 25 mg tablet Take 25 mg by mouth three (3) times daily as needed for Dizziness. betahistine HCl (BETAHISTINE, BULK,) Take 8 mg by mouth two (2) times a day. ibuprofen (MOTRIN) 200 mg tablet Take 200 mg by mouth every six (6) hours as needed for Pain. fluticasone propionate (FLONASE) 50 mcg/actuation nasal spray 2 Sprays by Both Nostrils route as needed. aspirin 81 mg chewable tablet Take 81 mg by mouth daily. PYRIDOXINE HCL, VITAMIN B6, (VITAMIN B-6 PO) Take  by mouth daily. (Patient not taking: Reported on 8/10/2022)     No current facility-administered medications for this visit. Lab Results   Component Value Date/Time    Cholesterol, total 207 (H) 09/13/2016 09:50 AM    HDL Cholesterol 51 09/13/2016 09:50 AM    LDL, calculated 139 (H) 09/13/2016 09:50 AM    VLDL, calculated 17 09/13/2016 09:50 AM    Triglyceride 86 09/13/2016 09:50 AM    CHOL/HDL Ratio 3.4 02/11/2014 11:44 AM       Lab Results   Component Value Date/Time    Sodium 140 11/26/2018 03:33 PM    Potassium 3.8 11/26/2018 03:33 PM    Chloride 101 11/26/2018 03:33 PM    CO2 25 11/26/2018 03:33 PM    Anion gap 8 02/12/2014 04:10 AM    Glucose 134 (H) 11/26/2018 03:33 PM    BUN 18 11/26/2018 03:33 PM    Creatinine 0.97 11/26/2018 03:33 PM    BUN/Creatinine ratio 19 11/26/2018 03:33 PM    GFR est AA 77 11/26/2018 03:33 PM    GFR est non-AA 66 11/26/2018 03:33 PM    Calcium 9.2 11/26/2018 03:33 PM       Lab Results   Component Value Date/Time    ALT (SGPT) 17 09/13/2016 09:50 AM    Alk.  phosphatase 101 09/13/2016 09:50 AM    Bilirubin, direct 0.1 10/08/2010 11:30 AM    Bilirubin, total 0.2 09/13/2016 09:50 AM       Lab Results   Component Value Date/Time    WBC 7.6 10/23/2018 04:18 PM    HGB 13.5 10/23/2018 04:18 PM    HCT 40.1 10/23/2018 04:18 PM PLATELET 604 90/70/8252 04:18 PM    MCV 90 10/23/2018 04:18 PM       Lab Results   Component Value Date/Time    TSH 1.070 09/13/2016 09:50 AM         Lab Results   Component Value Date/Time    Cholesterol, total 207 (H) 09/13/2016 09:50 AM    Cholesterol, total 174 11/21/2015 10:12 AM    Cholesterol, total 175 02/11/2014 11:44 AM    Cholesterol, total 185 10/08/2010 11:30 AM    HDL Cholesterol 51 09/13/2016 09:50 AM    HDL Cholesterol 43 11/21/2015 10:12 AM    HDL Cholesterol 52 02/11/2014 11:44 AM    HDL Cholesterol 47 10/08/2010 11:30 AM    LDL, calculated 139 (H) 09/13/2016 09:50 AM    LDL, calculated 116 (H) 11/21/2015 10:12 AM    LDL, calculated 106.6 (H) 02/11/2014 11:44 AM    LDL, calculated 121.6 (H) 10/08/2010 11:30 AM    Triglyceride 86 09/13/2016 09:50 AM    Triglyceride 77 11/21/2015 10:12 AM    Triglyceride 82 02/11/2014 11:44 AM    Triglyceride 82 10/08/2010 11:30 AM    CHOL/HDL Ratio 3.4 02/11/2014 11:44 AM    CHOL/HDL Ratio 3.9 10/08/2010 11:30 AM                Please note that this dictation was completed with [employer], the [employer] voice recognition software. Quite often unanticipated grammatical, syntax, homophones, and other interpretative errors are inadvertently transcribed by the computer software. Please disregard these errors. Please excuse any errors that have escaped final proofreading.

## 2022-08-10 NOTE — PROGRESS NOTES
Visit Vitals  /80   Pulse 72   Resp 16   Ht 5' 6\" (1.676 m)   Wt 280 lb (127 kg)   SpO2 97%   BMI 45.19 kg/m²

## 2022-08-31 DIAGNOSIS — I42.9 CARDIOMYOPATHY (HCC): ICD-10-CM

## 2022-08-31 DIAGNOSIS — I10 ESSENTIAL HYPERTENSION, BENIGN: ICD-10-CM

## 2022-08-31 RX ORDER — LOSARTAN POTASSIUM 50 MG/1
TABLET ORAL
Qty: 90 TABLET | Refills: 1 | Status: SHIPPED | OUTPATIENT
Start: 2022-08-31

## 2022-08-31 RX ORDER — CARVEDILOL 6.25 MG/1
TABLET ORAL
Qty: 180 TABLET | Refills: 1 | Status: SHIPPED | OUTPATIENT
Start: 2022-08-31

## 2022-08-31 RX ORDER — FUROSEMIDE 20 MG/1
20 TABLET ORAL AS NEEDED
Qty: 90 TABLET | Refills: 1 | Status: SHIPPED | OUTPATIENT
Start: 2022-08-31

## 2022-08-31 RX ORDER — POTASSIUM CHLORIDE 750 MG/1
10 CAPSULE, EXTENDED RELEASE ORAL AS NEEDED
Qty: 90 CAPSULE | Refills: 1 | Status: SHIPPED | OUTPATIENT
Start: 2022-08-31

## 2022-08-31 NOTE — TELEPHONE ENCOUNTER
Cardiologist: Dr. Carmen Rudd    Last appt: Visit date not found  Future Appointments   Date Time Provider Nilda Lou   9/7/2022  1:00 PM STANISLAW GUEVARA BS AMB   2/10/2023  8:20 AM Alejandro Velasco MD C[facility]REY BS AMB       Requested Prescriptions     Signed Prescriptions Disp Refills    losartan (COZAAR) 50 mg tablet 90 Tablet 1     Sig: TAKE 1 TABLET BY MOUTH EVERY DAY     Authorizing Provider: Haydee Irwin     Ordering User: TERRY NORTON    carvediloL (COREG) 6.25 mg tablet 180 Tablet 1     Sig: TAKE 1 TABLET BY MOUTH TWICE A DAY     Authorizing Provider: Haydee Irwin     Ordering User: TERRY NORTON    furosemide (LASIX) 20 mg tablet 90 Tablet 1     Sig: TAKE 1 TAB BY MOUTH AS NEEDED (SWELLING). Authorizing Provider: Haydee Irwin     Ordering User: TERRY NORTON    potassium chloride SA (MICRO-K) 10 mEq capsule 90 Capsule 1     Sig: TAKE 1 CAPSULE BY MOUTH AS NEEDED (WHEN LASIX IS TAKEN FOR SWELLING). Authorizing Provider: Haydee Irwin     Ordering User: TERRY NORTON         Refills VO per Dr. Carmen Rudd.

## 2022-09-07 ENCOUNTER — ANCILLARY PROCEDURE (OUTPATIENT)
Dept: CARDIOLOGY CLINIC | Age: 57
End: 2022-09-07
Payer: COMMERCIAL

## 2022-09-07 VITALS — HEIGHT: 66 IN | BODY MASS INDEX: 45 KG/M2 | WEIGHT: 280 LBS

## 2022-09-07 DIAGNOSIS — I42.9 CARDIOMYOPATHY, UNSPECIFIED TYPE (HCC): ICD-10-CM

## 2022-09-07 PROCEDURE — 93306 TTE W/DOPPLER COMPLETE: CPT | Performed by: SPECIALIST

## 2022-09-09 LAB
ECHO AO ASC DIAM: 3 CM
ECHO AO ASCENDING AORTA INDEX: 1.3 CM/M2
ECHO AO ROOT DIAM: 3.1 CM
ECHO AO ROOT INDEX: 1.34 CM/M2
ECHO AV AREA PEAK VELOCITY: 2.2 CM2
ECHO AV AREA VTI: 2.6 CM2
ECHO AV AREA/BSA PEAK VELOCITY: 1 CM2/M2
ECHO AV AREA/BSA VTI: 1.1 CM2/M2
ECHO AV MEAN GRADIENT: 5 MMHG
ECHO AV MEAN VELOCITY: 1.1 M/S
ECHO AV PEAK GRADIENT: 10 MMHG
ECHO AV PEAK VELOCITY: 1.6 M/S
ECHO AV VELOCITY RATIO: 0.63
ECHO AV VTI: 32 CM
ECHO LA DIAMETER INDEX: 1.99 CM/M2
ECHO LA DIAMETER: 4.6 CM
ECHO LA TO AORTIC ROOT RATIO: 1.48
ECHO LA VOL 2C: 77 ML (ref 22–52)
ECHO LA VOL 4C: 69 ML (ref 22–52)
ECHO LA VOL BP: 77 ML (ref 22–52)
ECHO LA VOL/BSA BIPLANE: 33 ML/M2 (ref 16–34)
ECHO LA VOLUME AREA LENGTH: 86 ML
ECHO LA VOLUME INDEX A2C: 33 ML/M2 (ref 16–34)
ECHO LA VOLUME INDEX A4C: 30 ML/M2 (ref 16–34)
ECHO LA VOLUME INDEX AREA LENGTH: 37 ML/M2 (ref 16–34)
ECHO LV EDV A2C: 70 ML
ECHO LV EDV A4C: 114 ML
ECHO LV EDV BP: 94 ML (ref 56–104)
ECHO LV EDV INDEX A4C: 49 ML/M2
ECHO LV EDV INDEX BP: 41 ML/M2
ECHO LV EDV NDEX A2C: 30 ML/M2
ECHO LV EJECTION FRACTION A2C: 40 %
ECHO LV EJECTION FRACTION A4C: 62 %
ECHO LV EJECTION FRACTION BIPLANE: 54 % (ref 55–100)
ECHO LV ESV A2C: 43 ML
ECHO LV ESV A4C: 43 ML
ECHO LV ESV BP: 43 ML (ref 19–49)
ECHO LV ESV INDEX A2C: 19 ML/M2
ECHO LV ESV INDEX A4C: 19 ML/M2
ECHO LV ESV INDEX BP: 19 ML/M2
ECHO LV FRACTIONAL SHORTENING: 29 % (ref 28–44)
ECHO LV INTERNAL DIMENSION DIASTOLE INDEX: 2.42 CM/M2
ECHO LV INTERNAL DIMENSION DIASTOLIC: 5.6 CM (ref 3.9–5.3)
ECHO LV INTERNAL DIMENSION SYSTOLIC INDEX: 1.73 CM/M2
ECHO LV INTERNAL DIMENSION SYSTOLIC: 4 CM
ECHO LV IVSD: 1.3 CM (ref 0.6–0.9)
ECHO LV MASS 2D: 313.2 G (ref 67–162)
ECHO LV MASS INDEX 2D: 135.6 G/M2 (ref 43–95)
ECHO LV POSTERIOR WALL DIASTOLIC: 1.3 CM (ref 0.6–0.9)
ECHO LV RELATIVE WALL THICKNESS RATIO: 0.46
ECHO LVOT AREA: 3.5 CM2
ECHO LVOT AV VTI INDEX: 0.74
ECHO LVOT DIAM: 2.1 CM
ECHO LVOT MEAN GRADIENT: 2 MMHG
ECHO LVOT PEAK GRADIENT: 4 MMHG
ECHO LVOT PEAK VELOCITY: 1 M/S
ECHO LVOT STROKE VOLUME INDEX: 35.4 ML/M2
ECHO LVOT SV: 81.7 ML
ECHO LVOT VTI: 23.6 CM
ECHO MV A VELOCITY: 0.72 M/S
ECHO MV E DECELERATION TIME (DT): 290 MS
ECHO MV E VELOCITY: 0.73 M/S
ECHO MV E/A RATIO: 1.01
ECHO PV MAX VELOCITY: 1 M/S
ECHO PV PEAK GRADIENT: 4 MMHG
ECHO RA MINOR AXIS INDEX: 1.6 CM/M2
ECHO RA MINOR AXIS: 3.7 CM
ECHO RV INTERNAL DIMENSION: 3.9 CM
ECHO TV REGURGITANT MAX VELOCITY: 2.54 M/S
ECHO TV REGURGITANT PEAK GRADIENT: 26 MMHG

## 2022-12-04 DIAGNOSIS — I42.9 CARDIOMYOPATHY (HCC): ICD-10-CM

## 2022-12-04 DIAGNOSIS — I10 ESSENTIAL HYPERTENSION, BENIGN: ICD-10-CM

## 2022-12-07 RX ORDER — CARVEDILOL 6.25 MG/1
TABLET ORAL
Qty: 180 TABLET | Refills: 1 | Status: SHIPPED | OUTPATIENT
Start: 2022-12-07

## 2022-12-07 RX ORDER — FUROSEMIDE 20 MG/1
20 TABLET ORAL AS NEEDED
Qty: 90 TABLET | Refills: 1 | Status: SHIPPED | OUTPATIENT
Start: 2022-12-07

## 2022-12-07 RX ORDER — LOSARTAN POTASSIUM 50 MG/1
TABLET ORAL
Qty: 90 TABLET | Refills: 1 | Status: SHIPPED | OUTPATIENT
Start: 2022-12-07

## 2022-12-07 RX ORDER — POTASSIUM CHLORIDE 750 MG/1
10 CAPSULE, EXTENDED RELEASE ORAL AS NEEDED
Qty: 90 CAPSULE | Refills: 1 | Status: SHIPPED | OUTPATIENT
Start: 2022-12-07

## 2022-12-07 NOTE — TELEPHONE ENCOUNTER
Requested Prescriptions     Signed Prescriptions Disp Refills    losartan (COZAAR) 50 mg tablet 90 Tablet 1     Sig: TAKE 1 TABLET BY MOUTH EVERY DAY     Authorizing Provider: Deven [facility]     Ordering User: Fredi THAO    potassium chloride SA (MICRO-K) 10 mEq capsule 90 Capsule 1     Sig: TAKE 1 CAPSULE BY MOUTH AS NEEDED (WHEN LASIX IS TAKEN FOR SWELLING). Authorizing Provider: Deven Affibody     Ordering User: Fredi THAO    carvediloL (COREG) 6.25 mg tablet 180 Tablet 1     Sig: TAKE 1 TABLET BY MOUTH TWICE A DAY     Authorizing Provider: Deven Michelle     Ordering User: Fredi THAO    furosemide (LASIX) 20 mg tablet 90 Tablet 1     Sig: TAKE 1 TAB BY MOUTH AS NEEDED (SWELLING).      Authorizing Provider: [email or web address]     Ordering User: Pablo Roberts    Per Dr. Rosalba Barkley verbal order

## 2023-02-10 ENCOUNTER — OFFICE VISIT (OUTPATIENT)
Dept: CARDIOLOGY CLINIC | Age: 58
End: 2023-02-10
Payer: COMMERCIAL

## 2023-02-10 VITALS
RESPIRATION RATE: 18 BRPM | HEART RATE: 81 BPM | SYSTOLIC BLOOD PRESSURE: 136 MMHG | OXYGEN SATURATION: 96 % | DIASTOLIC BLOOD PRESSURE: 88 MMHG | WEIGHT: 268 LBS | BODY MASS INDEX: 43.07 KG/M2 | HEIGHT: 66 IN

## 2023-02-10 DIAGNOSIS — I42.9 CARDIOMYOPATHY, UNSPECIFIED TYPE (HCC): Primary | ICD-10-CM

## 2023-02-10 NOTE — PROGRESS NOTES
[facility]                                                            OFFICE NOTE        Liliya Dahl M.D.,ROGELIO Michele Foster   1965  [id number]    Date/Time:  2/10/78980:35 AM            SUBJECTIVE:  Wt Readings from Last 3 Encounters:   02/10/23 268 lb (121.6 kg)   09/07/22 280 lb (127 kg)   08/10/22 280 lb (127 kg)     She is doing extremely well. She walks 40 minutes about 3-4 times a week with no issues. She denies any chest pain shortness of breath palpitation presyncopal syncopal episodes. Assessment/Plan  1. Cardiomyopathy: She does have a nonischemic cardiomyopathy. She is clinically compensated. Her electrocardiogram today reveals a normal sinus rhythm and V-paced. Last echocardiogram of 9/22  revealed ejection fraction of 50 to 55%, mild MR. Continue same dose of Coreg and losartan. Continue to use Lasix and potassium on a as needed basis. CRT evaluation on hold for now. Obtain follow-up echocardiogram in 6-7 months    2. Permanent pacemaker: Continue routine surveillance with Dr. Gilbert Deshpande. CRT on hold     3. Hypertension: Very slightly elevated today but she tells me she has not taken her medications yet. She tells me that usually at home the diastolic is consistently below 80 mmHg. 4.  Hyperlipidemia: Closely followed by primary care physician. 5.  Ménière disease: Stable closely followed by ENT. Cassia Ontiveros Discussed nutrition and exercise. She has lost 12 pounds since her last office visit! Otherwise  I will see her back in 6 months or of course sooner if any question or problems arise prior to that. Echo in 6 months 1 week prior to visit        HPI   61 y.o. female. previously followed by Dr. Abhishek De La Cruz. She has a history of hypertension and previous moderate left ventricular systolic dysfunction by echocardiography.      She underwent a left heart cardiac catheterization following an abnormal stress test. This apparently occurred at [facility] in 2006, and revealed no evidence of significant coronary obstructive disease. Her ejection fraction eventually improved. Date of previous stress test was 4/2007. This was a Persantine Myoview stress test. No chest pain was reported. Indeterminate electrocardiographic changes. There were no evidences for gross ischemia. There was fixed defect at the level of the anterior wall for which tissue attenuation could be responsible. Ejection fraction was estimated at 52%. echocardiogram on 3/2007. Normal left ventricular systolic function with an ejection fraction approximately of 50%, mild left ventricular hypertrophy, trivial aortic insufficiency and mild mitral regurgitation. Stress echo on 2010 no ischemia or MI and EF at rest 50%  Echo on 2/14:Systolic function was at the lower limits of normal.  Ejection fraction was estimated to be 45 % in the range of 40 % to 50 %. There was mild diffuse hypokinesis. Left atrium: The atrium was mildly dilated. Tricuspid valve: There was mild regurgitation. PPM for CHB on 2/14 with Dr. Colletta Browns, unfortunately leads dislodge next day and patient was taken to [facility] after syncopal episode and second PPM placed     Echo on 11/15:Systolic function was mildly reduced by EF (biplane method  of disks). Ejection fraction was estimated to be 44 %. There was mild  diffuse hypokinesis. There was moderate hypokinesis of the mid-apical  anterior, mid-apical inferior, and apical lateral wall(s). Doppler  parameters were consistent with abnormal left ventricular relaxation  (grade 1 diastolic dysfunction). Left atrium: The atrium was mildly dilated. ECHO on 6/16:Left ventricle: Size was normal. Systolic function was by EF (biplane  method of disks). Ejection fraction was estimated to be 44 %.  There was  moderate hypokinesis of the mid-apical anterior wall(s). Wall thickness  was normal.  COMPARISONS:  There has been no significant change. Comparison was made with the  previous study of 06-Nov-2015. ECHO on 7/17:Systolic function was at the lower limits of normal.  Ejection fraction was estimated in the range of 50 % to 55 %. There were  no regional wall motion abnormalities. COMPARISONS:  Comparison was made with the previous study of 07-Jun-2016. LV overall  function has increased from 44 % to 53 %. Echo on 8/18:Systolic function was at the lower limits of normal.  Ejection fraction was estimated to be 50 %. There was hypokinesis of the  mid-apical inferior, apical septal, and apical lateral wall(s). Wall  thickness was mildly increased. Aortic valve: The valve was trileaflet. Leaflets exhibited sclerosis. Comparisons: LV overall function has not changed. RWMA now noted  Nuclear stress test on 10/18:Myocardial perfusion imaging is abnormal: there is a small area of infarction in the distal inferior wall. No gross ischemia noted. Also attenuation/pacemaker artifact likely present at the level of anterior wall  Overall left ventricular systolic function was normal. EF 54%        Cardiac  Catheterization on 10/30/18:LM: normal  LAD: normal  CX: codominant normal  RCA: codominant normal  LV EF 45-50%              CARDIAC STUDIES        09/07/22    ECHO ADULT COMPLETE 09/09/2022 9/9/2022    Interpretation Summary    Left Ventricle: Low normal left ventricular systolic function with a visually estimated EF of 50 - 55%. EF by 2D Simpsons Biplane is 54%. Left ventricle is mildly dilated. Increased wall thickness. Findings consistent with mild concentric hypertrophy. Normal wall motion. Mitral Valve: Mild regurgitation. Left Atrium: Left atrium is mildly dilated.     Signed by: Suzy Segovia MD on 9/9/2022  5:17 PM                              EKG Results       Procedure 720 Value Units Date/Time    AMB POC EKG ROUTINE W/ 12 LEADS, INTER & REP [[id number]]     Order Status: No result                 IMAGING      MRI Results (most recent):  No results found for this or any previous visit. CT Results (most recent):  No results found for this or any previous visit. XR Results (most recent):  Results from Hospital Encounter encounter on 10/24/18    XR CHEST PA LAT    Narrative  EXAM:  XR CHEST PA LAT  INDICATION:  pre cath, history dilated cardiomyopathy, abnormal EKG, history of  pacemaker, chest pain unspecified, R07.9, R 93.1, E 66.01, E 78.5  TECHNIQUE:  PA and lateral chest x-rays were obtained. COMPARISON: Portable chest x-ray 2/11/14  FINDINGS:  Borderline heart size. Pulmonary vascularity is not congested. Cardiac pacer is again noted. Lungs are adequately expanded and there are no focal infiltrates or areas of  consolidation. Patient has chronic degenerative change and accentuated kyphosis at the  thoracolumbar junction. Impression  IMPRESSION:  1. No acute cardiopulmonary abnormality demonstrated. 2. Suspect old spine injury thoracolumbar junction. Past Medical History:   Diagnosis Date    Essential hypertension     Left ventricular dysfunction      Past Surgical History:   Procedure Laterality Date    HX ORTHOPAEDIC  1990    arthroscopic surgery right knee    INS PPM/ICD LED DUAL ONLY  2/12/2014         [place] CARDIAC SURG PROCEDURE UNLIST      cath 2005     Social History     Tobacco Use    Smoking status: Never    Smokeless tobacco: Never   Substance Use Topics    Alcohol use: No     Family History   Problem Relation Age of Onset    Stroke Mother      No Known Allergies      There were no vitals taken for this visit. There were no vitals filed for this visit. Review of Systems:   Pertinent items are noted in the History of Present Illness.        Visit Vitals  /88 (BP 1 Location: Left upper arm, BP Patient Position: Sitting, BP Cuff Size: Adult)   Pulse 81   Resp 18   Ht 5' 6\" (1.676 m)   Wt 268 lb (121.6 kg)   SpO2 96%   BMI 43.26 kg/m²     General Appearance:  Well developed, well nourished,alert and oriented x 3, and individual in no acute distress. Ears/Nose/Mouth/Throat:   Hearing grossly normal.         Neck: Supple. Chest:   Lungs clear to auscultation bilaterally. Cardiovascular:  Regular rate and rhythm, S1, S2 normal, no murmur. Abdomen:   Soft, non-tender, bowel sounds are active. Extremities: No edema bilaterally. Skin: Warm and dry. Current Outpatient Medications on File Prior to Visit   Medication Sig Dispense Refill    losartan (COZAAR) 50 mg tablet TAKE 1 TABLET BY MOUTH EVERY DAY 90 Tablet 1    potassium chloride SA (MICRO-K) 10 mEq capsule TAKE 1 CAPSULE BY MOUTH AS NEEDED (WHEN LASIX IS TAKEN FOR SWELLING). 90 Capsule 1    carvediloL (COREG) 6.25 mg tablet TAKE 1 TABLET BY MOUTH TWICE A  Tablet 1    furosemide (LASIX) 20 mg tablet TAKE 1 TAB BY MOUTH AS NEEDED (SWELLING). 90 Tablet 1    meclizine (ANTIVERT) 25 mg tablet Take 25 mg by mouth three (3) times daily as needed for Dizziness. 1    betahistine HCl (BETAHISTINE, BULK,) Take 8 mg by mouth two (2) times a day. ibuprofen (MOTRIN) 200 mg tablet Take 200 mg by mouth every six (6) hours as needed for Pain. fluticasone propionate (FLONASE) 50 mcg/actuation nasal spray 2 Sprays by Both Nostrils route as needed. PYRIDOXINE HCL, VITAMIN B6, (VITAMIN B-6 PO) Take  by mouth daily. (Patient not taking: Reported on 8/10/2022)      aspirin 81 mg chewable tablet Take 81 mg by mouth daily. No current facility-administered medications on file prior to visit. Don Valdez had no medications administered during this visit. Current Outpatient Medications   Medication Sig    losartan (COZAAR) 50 mg tablet TAKE 1 TABLET BY MOUTH EVERY DAY    potassium chloride SA (MICRO-K) 10 mEq capsule TAKE 1 CAPSULE BY MOUTH AS NEEDED (WHEN LASIX IS TAKEN FOR SWELLING).     carvediloL (COREG) 6.25 mg tablet TAKE 1 TABLET BY MOUTH TWICE A DAY    furosemide (LASIX) 20 mg tablet TAKE 1 TAB BY MOUTH AS NEEDED (SWELLING). meclizine (ANTIVERT) 25 mg tablet Take 25 mg by mouth three (3) times daily as needed for Dizziness. betahistine HCl (BETAHISTINE, BULK,) Take 8 mg by mouth two (2) times a day. ibuprofen (MOTRIN) 200 mg tablet Take 200 mg by mouth every six (6) hours as needed for Pain. fluticasone propionate (FLONASE) 50 mcg/actuation nasal spray 2 Sprays by Both Nostrils route as needed. PYRIDOXINE HCL, VITAMIN B6, (VITAMIN B-6 PO) Take  by mouth daily. (Patient not taking: Reported on 8/10/2022)    aspirin 81 mg chewable tablet Take 81 mg by mouth daily. No current facility-administered medications for this visit. Lab Results   Component Value Date/Time    Cholesterol, total 207 (H) 09/13/2016 09:50 AM    HDL Cholesterol 51 09/13/2016 09:50 AM    LDL, calculated 139 (H) 09/13/2016 09:50 AM    VLDL, calculated 17 09/13/2016 09:50 AM    Triglyceride 86 09/13/2016 09:50 AM    CHOL/HDL Ratio 3.4 02/11/2014 11:44 AM       Lab Results   Component Value Date/Time    Sodium 140 11/26/2018 03:33 PM    Potassium 3.8 11/26/2018 03:33 PM    Chloride 101 11/26/2018 03:33 PM    CO2 25 11/26/2018 03:33 PM    Anion gap 8 02/12/2014 04:10 AM    Glucose 134 (H) 11/26/2018 03:33 PM    BUN 18 11/26/2018 03:33 PM    Creatinine 0.97 11/26/2018 03:33 PM    BUN/Creatinine ratio 19 11/26/2018 03:33 PM    GFR est AA 77 11/26/2018 03:33 PM    GFR est non-AA 66 11/26/2018 03:33 PM    Calcium 9.2 11/26/2018 03:33 PM       Lab Results   Component Value Date/Time    ALT (SGPT) 17 09/13/2016 09:50 AM    Alk.  phosphatase 101 09/13/2016 09:50 AM    Bilirubin, direct 0.1 10/08/2010 11:30 AM    Bilirubin, total 0.2 09/13/2016 09:50 AM       Lab Results   Component Value Date/Time    WBC 7.6 10/23/2018 04:18 PM    HGB 13.5 10/23/2018 04:18 PM    HCT 40.1 10/23/2018 04:18 PM    PLATELET 231 78/10/7255 04:18 PM    MCV 90 10/23/2018 04:18 PM       Lab Results   Component Value Date/Time    TSH 1.070 09/13/2016 09:50 AM         Lab Results   Component Value Date/Time    Cholesterol, total 207 (H) 09/13/2016 09:50 AM    Cholesterol, total 174 11/21/2015 10:12 AM    Cholesterol, total 175 02/11/2014 11:44 AM    Cholesterol, total 185 10/08/2010 11:30 AM    HDL Cholesterol 51 09/13/2016 09:50 AM    HDL Cholesterol 43 11/21/2015 10:12 AM    HDL Cholesterol 52 02/11/2014 11:44 AM    HDL Cholesterol 47 10/08/2010 11:30 AM    LDL, calculated 139 (H) 09/13/2016 09:50 AM    LDL, calculated 116 (H) 11/21/2015 10:12 AM    LDL, calculated 106.6 (H) 02/11/2014 11:44 AM    LDL, calculated 121.6 (H) 10/08/2010 11:30 AM    Triglyceride 86 09/13/2016 09:50 AM    Triglyceride 77 11/21/2015 10:12 AM    Triglyceride 82 02/11/2014 11:44 AM    Triglyceride 82 10/08/2010 11:30 AM    CHOL/HDL Ratio 3.4 02/11/2014 11:44 AM    CHOL/HDL Ratio 3.9 10/08/2010 11:30 AM                Please note that this dictation was completed with [employer], the [employer] voice recognition software. Quite often unanticipated grammatical, syntax, homophones, and other interpretative errors are inadvertently transcribed by the computer software. Please disregard these errors. Please excuse any errors that have escaped final proofreading.

## 2023-02-10 NOTE — PROGRESS NOTES
Visit Vitals  /88 (BP 1 Location: Left upper arm, BP Patient Position: Sitting, BP Cuff Size: Adult)   Pulse 81   Resp 18   Ht 5' 6\" (1.676 m)   Wt 268 lb (121.6 kg)   SpO2 96%   BMI 43.26 kg/m²

## 2023-08-13 DIAGNOSIS — I42.9 CARDIOMYOPATHY, UNSPECIFIED (HCC): ICD-10-CM

## 2023-08-13 DIAGNOSIS — I10 ESSENTIAL (PRIMARY) HYPERTENSION: ICD-10-CM

## 2023-08-16 ENCOUNTER — TELEPHONE (OUTPATIENT)
Age: 58
End: 2023-08-16

## 2023-08-16 NOTE — TELEPHONE ENCOUNTER
PT called stated she recv'vd call from her Pharmacy concerning Doctor officce has not returned call for pt medication refill for Losartan 50mg , and carvedilol 6.25mg    Pharmacy#[phone]    #[phone]

## 2023-08-17 RX ORDER — CARVEDILOL 6.25 MG/1
TABLET ORAL
Qty: 180 TABLET | Refills: 1 | Status: SHIPPED | OUTPATIENT
Start: 2023-08-17

## 2023-08-17 RX ORDER — POTASSIUM CHLORIDE 750 MG/1
CAPSULE, EXTENDED RELEASE ORAL
Qty: 90 CAPSULE | Refills: 1 | Status: SHIPPED | OUTPATIENT
Start: 2023-08-17

## 2023-08-17 RX ORDER — LOSARTAN POTASSIUM 50 MG/1
TABLET ORAL
Qty: 90 TABLET | Refills: 1 | Status: SHIPPED | OUTPATIENT
Start: 2023-08-17

## 2023-08-17 NOTE — TELEPHONE ENCOUNTER
Requested Prescriptions     Signed Prescriptions Disp Refills    carvedilol (COREG) 6.25 MG tablet 180 tablet 1     Sig: TAKE 1 TABLET BY MOUTH TWICE A DAY     Authorizing Provider: Ocie Koyanagi     Ordering User: Starlin Canavan, AMELIA L    losartan (COZAAR) 50 MG tablet 90 tablet 1     Sig: TAKE 1 TABLET BY MOUTH EVERY DAY     Authorizing Provider: Ocie Koyanagi     Ordering User: JERALD CLEMENT    potassium chloride (MICRO-K) 10 MEQ extended release capsule 90 capsule 1     Sig: TAKE 1 CAPSULE BY MOUTH EVERY DAY AS NEEDED (WHEN LASIX IS TAKEN FOR SWELLING)     Authorizing Provider: Ocie Koyanagi     Ordering User: Hyun Nobles     Verbal order per Dr. Jonathan Pedraza.     Future Appointments   Date Time Provider [id number]  [address]   8/28/2023  8:00 AM [facility] ECHO 1 GORAN MAGALLANES   11/6/2023  9:20 AM Ocie Koyanagi, MD [facility] AMB

## 2025-06-16 NOTE — PROGRESS NOTES
Visit Vitals  /80 (BP 1 Location: Left arm, BP Patient Position: Sitting)   Pulse 60   Resp 16   Ht 5' 6\" (1.676 m)   Wt 266 lb (120.7 kg)   SpO2 97%   BMI 42.93 kg/m² - - -